# Patient Record
Sex: FEMALE | Race: WHITE | Employment: PART TIME | URBAN - NONMETROPOLITAN AREA
[De-identification: names, ages, dates, MRNs, and addresses within clinical notes are randomized per-mention and may not be internally consistent; named-entity substitution may affect disease eponyms.]

---

## 2017-11-01 LAB
CHOLESTEROL, TOTAL: 169 MG/DL
CHOLESTEROL/HDL RATIO: 4.1
HDLC SERPL-MCNC: 41 MG/DL (ref 35–70)
LDL CHOLESTEROL CALCULATED: 106 MG/DL (ref 0–160)
TRIGL SERPL-MCNC: 123 MG/DL
VLDLC SERPL CALC-MCNC: NORMAL MG/DL

## 2018-02-19 LAB
CREATININE: 0.7 MG/DL
POTASSIUM (K+): 3.7

## 2018-08-31 LAB
CREATININE: 0.8 MG/DL
POTASSIUM (K+): 3.9

## 2019-04-17 VITALS
WEIGHT: 218 LBS | TEMPERATURE: 98.4 F | BODY MASS INDEX: 35.03 KG/M2 | HEIGHT: 66 IN | SYSTOLIC BLOOD PRESSURE: 148 MMHG | DIASTOLIC BLOOD PRESSURE: 96 MMHG | HEART RATE: 80 BPM

## 2019-04-17 RX ORDER — LISINOPRIL AND HYDROCHLOROTHIAZIDE 25; 20 MG/1; MG/1
1 TABLET ORAL DAILY
COMMUNITY
End: 2019-05-13 | Stop reason: SDUPTHER

## 2019-04-17 RX ORDER — CATNIP
POWDER (GRAM) MISCELLANEOUS 2 TIMES DAILY
COMMUNITY
End: 2021-01-08 | Stop reason: ALTCHOICE

## 2019-04-17 RX ORDER — MILK THISTLE 150 MG
150 CAPSULE ORAL DAILY
COMMUNITY
End: 2020-05-22 | Stop reason: ALTCHOICE

## 2019-04-17 RX ORDER — IBUPROFEN 200 MG
200 TABLET ORAL EVERY 6 HOURS PRN
COMMUNITY

## 2019-04-17 RX ORDER — KETOROLAC TROMETHAMINE 5 MG/ML
1 SOLUTION OPHTHALMIC 3 TIMES DAILY
COMMUNITY
End: 2019-05-31 | Stop reason: SDUPTHER

## 2019-05-13 RX ORDER — LISINOPRIL AND HYDROCHLOROTHIAZIDE 25; 20 MG/1; MG/1
1 TABLET ORAL DAILY
Qty: 90 TABLET | Refills: 1 | Status: SHIPPED | OUTPATIENT
Start: 2019-05-13 | End: 2019-10-28 | Stop reason: SDUPTHER

## 2019-05-31 ENCOUNTER — OFFICE VISIT (OUTPATIENT)
Dept: FAMILY MEDICINE CLINIC | Age: 49
End: 2019-05-31
Payer: COMMERCIAL

## 2019-05-31 ENCOUNTER — HOSPITAL ENCOUNTER (OUTPATIENT)
Age: 49
Discharge: HOME | End: 2019-06-02
Payer: COMMERCIAL

## 2019-05-31 VITALS
BODY MASS INDEX: 36.32 KG/M2 | HEIGHT: 66 IN | SYSTOLIC BLOOD PRESSURE: 172 MMHG | TEMPERATURE: 97.5 F | OXYGEN SATURATION: 98 % | HEART RATE: 89 BPM | DIASTOLIC BLOOD PRESSURE: 100 MMHG | WEIGHT: 226 LBS

## 2019-05-31 DIAGNOSIS — R79.89 LIVER FUNCTION TEST ABNORMALITY: ICD-10-CM

## 2019-05-31 DIAGNOSIS — I10 ESSENTIAL HYPERTENSION: Primary | ICD-10-CM

## 2019-05-31 DIAGNOSIS — E66.09 CLASS 2 OBESITY DUE TO EXCESS CALORIES WITHOUT SERIOUS COMORBIDITY WITH BODY MASS INDEX (BMI) OF 37.0 TO 37.9 IN ADULT: ICD-10-CM

## 2019-05-31 DIAGNOSIS — T78.40XD ALLERGIC STATE, SUBSEQUENT ENCOUNTER: ICD-10-CM

## 2019-05-31 DIAGNOSIS — G47.33 OSA (OBSTRUCTIVE SLEEP APNEA): ICD-10-CM

## 2019-05-31 PROBLEM — E66.812 CLASS 2 OBESITY DUE TO EXCESS CALORIES WITHOUT SERIOUS COMORBIDITY WITH BODY MASS INDEX (BMI) OF 37.0 TO 37.9 IN ADULT: Status: ACTIVE | Noted: 2019-05-31

## 2019-05-31 LAB
ALBUMIN SERPL-MCNC: 4.1 G/DL (ref 3.5–5.2)
ALP BLD-CCNC: 54 U/L (ref 35–104)
ALT SERPL-CCNC: 41 U/L (ref 0–32)
ANION GAP SERPL CALCULATED.3IONS-SCNC: 9 MMOL/L (ref 7–16)
AST SERPL-CCNC: 44 U/L (ref 0–31)
BASOPHILS ABSOLUTE: 0.06 E9/L (ref 0–0.2)
BASOPHILS RELATIVE PERCENT: 0.9 % (ref 0–2)
BILIRUB SERPL-MCNC: <0.2 MG/DL (ref 0–1.2)
BUN BLDV-MCNC: 14 MG/DL (ref 6–20)
CALCIUM SERPL-MCNC: 9.2 MG/DL (ref 8.6–10.2)
CHLORIDE BLD-SCNC: 102 MMOL/L (ref 98–107)
CHOLESTEROL, TOTAL: 189 MG/DL (ref 0–199)
CO2: 28 MMOL/L (ref 22–29)
CREAT SERPL-MCNC: 0.8 MG/DL (ref 0.5–1)
EOSINOPHILS ABSOLUTE: 0.31 E9/L (ref 0.05–0.5)
EOSINOPHILS RELATIVE PERCENT: 4.7 % (ref 0–6)
GFR AFRICAN AMERICAN: >60
GFR NON-AFRICAN AMERICAN: >60 ML/MIN/1.73
GLUCOSE BLD-MCNC: 109 MG/DL (ref 74–99)
HCT VFR BLD CALC: 38.3 % (ref 34–48)
HDLC SERPL-MCNC: 42 MG/DL
HEMOGLOBIN: 12.2 G/DL (ref 11.5–15.5)
IMMATURE GRANULOCYTES #: 0.02 E9/L
IMMATURE GRANULOCYTES %: 0.3 % (ref 0–5)
LDL CHOLESTEROL CALCULATED: 118 MG/DL (ref 0–99)
LYMPHOCYTES ABSOLUTE: 1.56 E9/L (ref 1.5–4)
LYMPHOCYTES RELATIVE PERCENT: 23.5 % (ref 20–42)
MCH RBC QN AUTO: 29 PG (ref 26–35)
MCHC RBC AUTO-ENTMCNC: 31.9 % (ref 32–34.5)
MCV RBC AUTO: 91.2 FL (ref 80–99.9)
MONOCYTES ABSOLUTE: 0.5 E9/L (ref 0.1–0.95)
MONOCYTES RELATIVE PERCENT: 7.5 % (ref 2–12)
NEUTROPHILS ABSOLUTE: 4.18 E9/L (ref 1.8–7.3)
NEUTROPHILS RELATIVE PERCENT: 63.1 % (ref 43–80)
PDW BLD-RTO: 13.8 FL (ref 11.5–15)
PLATELET # BLD: 272 E9/L (ref 130–450)
PMV BLD AUTO: 12.2 FL (ref 7–12)
POTASSIUM SERPL-SCNC: 3.9 MMOL/L (ref 3.5–5)
RBC # BLD: 4.2 E12/L (ref 3.5–5.5)
SODIUM BLD-SCNC: 139 MMOL/L (ref 132–146)
TOTAL PROTEIN: 7.7 G/DL (ref 6.4–8.3)
TRIGL SERPL-MCNC: 143 MG/DL (ref 0–149)
VLDLC SERPL CALC-MCNC: 29 MG/DL
WBC # BLD: 6.6 E9/L (ref 4.5–11.5)

## 2019-05-31 PROCEDURE — 80061 LIPID PANEL: CPT

## 2019-05-31 PROCEDURE — 99396 PREV VISIT EST AGE 40-64: CPT | Performed by: INTERNAL MEDICINE

## 2019-05-31 PROCEDURE — 80053 COMPREHEN METABOLIC PANEL: CPT

## 2019-05-31 PROCEDURE — 85025 COMPLETE CBC W/AUTO DIFF WBC: CPT

## 2019-05-31 PROCEDURE — 36415 COLL VENOUS BLD VENIPUNCTURE: CPT

## 2019-05-31 RX ORDER — FEXOFENADINE HCL 180 MG/1
180 TABLET ORAL DAILY
Qty: 90 TABLET | Refills: 2 | Status: SHIPPED | OUTPATIENT
Start: 2019-05-31 | End: 2020-05-22 | Stop reason: SDUPTHER

## 2019-05-31 RX ORDER — KETOROLAC TROMETHAMINE 5 MG/ML
1 SOLUTION OPHTHALMIC 3 TIMES DAILY
Qty: 1 BOTTLE | Refills: 2 | Status: SHIPPED
Start: 2019-05-31 | End: 2020-05-22 | Stop reason: SDUPTHER

## 2019-05-31 RX ORDER — MEDROXYPROGESTERONE ACETATE 10 MG/1
10 TABLET ORAL
Refills: 0 | COMMUNITY
Start: 2019-04-05 | End: 2020-05-22 | Stop reason: ALTCHOICE

## 2019-05-31 RX ORDER — FEXOFENADINE HCL 180 MG/1
180 TABLET ORAL DAILY
Qty: 90 TABLET | Refills: 2 | Status: SHIPPED | OUTPATIENT
Start: 2019-05-31 | End: 2019-05-31 | Stop reason: SDUPTHER

## 2019-05-31 ASSESSMENT — PATIENT HEALTH QUESTIONNAIRE - PHQ9
SUM OF ALL RESPONSES TO PHQ QUESTIONS 1-9: 0
2. FEELING DOWN, DEPRESSED OR HOPELESS: 0
1. LITTLE INTEREST OR PLEASURE IN DOING THINGS: 0
SUM OF ALL RESPONSES TO PHQ QUESTIONS 1-9: 0
SUM OF ALL RESPONSES TO PHQ9 QUESTIONS 1 & 2: 0

## 2019-06-02 ASSESSMENT — ENCOUNTER SYMPTOMS
COUGH: 0
SHORTNESS OF BREATH: 0
ABDOMINAL PAIN: 0
DIARRHEA: 0
WHEEZING: 0
NAUSEA: 0
SINUS PAIN: 0
BLOOD IN STOOL: 0
BACK PAIN: 0
RHINORRHEA: 0
EYES NEGATIVE: 1
CONSTIPATION: 0
VOMITING: 0

## 2019-06-02 NOTE — PROGRESS NOTES
[id number] [facility]     19  TVPage Divers : 1970 Sex: female  Age: 46 y.o. Chief Complaint   Patient presents with    Hypertension   Complete exam    HPI patient presents today for follow-up visit problems below. She has been feeling well. Unfortunately she has gained another 8 pounds. Speech has become more active and has been working on weight. She has had elevated liver enzymes and has seen Dr. Meg Sarabia past. She is now 46years old we also did discuss colonoscopy which she is not ready to take just yet she states but will think about it over the next several months. Dr. Wilhelminia Severance had said that if she achieved a weight of 195 and still had elevated LFTs we will consider liver biopsy. She is aware of this. She brings in blood pressure numbers which looked good. Today's numbers were very high. She has had her machine compared with her work machine which has been very close. She has also been continuing to use her CPAP with success. She is up-to-date with pulmonary and GYN. Review of Systems   Constitutional: Negative for activity change, appetite change, chills, diaphoresis, fatigue, fever and unexpected weight change. Overweight   HENT: Negative for congestion, ear pain, hearing loss, postnasal drip, rhinorrhea and sinus pain. Eyes: Negative. Respiratory: Negative for cough, shortness of breath and wheezing. Cardiovascular: Negative for chest pain, palpitations and leg swelling. Gastrointestinal: Negative for abdominal pain, blood in stool, constipation, diarrhea, nausea and vomiting. Endocrine: Negative. Genitourinary: Negative for difficulty urinating, dysuria, frequency, hematuria and urgency. Musculoskeletal: Negative for arthralgias, back pain, gait problem and myalgias. Skin: Negative. Allergic/Immunologic: Negative for environmental allergies and immunocompromised state.    Neurological: Negative for dizziness, weakness, light-headedness, numbness and headaches. Hematological: Negative. Psychiatric/Behavioral: Negative for sleep disturbance. The patient is not nervous/anxious. REST OF PERTINENT ROS GONE OVER AND WAS NEGATIVE. PMH:  Health Maintenance:  Pelvic/Pap Exam - GYN  Breast Exam - GYN-dr ruiz  Mammogram - GYN  EKG - 1/14, 5/16  Medical Problems:  Hypertension  Fatty Liver - ultrasound 5/14  elevated LFT's - pos hep B atb-has had vaccine  upper lip hemangioma, preeclampsia, Sleep Apnea  FH:  no siblings. Father:  Hypertension, Diverticulosis. Mother:  osteopenia. Maternal Grandmother:  Breast Cancer. SH:  Marital: . Abuse: physical therapist.  Personal Habits: Cigarette Use: Does Not Smoke. Alcohol: Occasionally consumes alcohol    Current Outpatient Medications:     medroxyPROGESTERone (PROVERA) 10 MG tablet, 10 mg Indications: take 20th- 24th every month , Disp: , Rfl: 0    Ibuprofen-diphenhydrAMINE HCl (ADVIL PM) 200-25 MG CAPS, Take by mouth, Disp: , Rfl:     ketorolac (ACULAR) 0.5 % ophthalmic solution, Place 1 drop into both eyes 3 times daily, Disp: 1 Bottle, Rfl: 2    fexofenadine (ALLEGRA) 180 MG tablet, Take 1 tablet by mouth daily, Disp: 90 tablet, Rfl: 2    lisinopril-hydrochlorothiazide (PRINZIDE;ZESTORETIC) 20-25 MG per tablet, Take 1 tablet by mouth daily, Disp: 90 tablet, Rfl: 1    Milk Thistle 150 MG CAPS, Take 150 mg by mouth daily, Disp: , Rfl:     Catnip POWD, by Does not apply route 2 times daily, Disp: , Rfl:     PSYLLIUM PO, Take by mouth, Disp: , Rfl:     ibuprofen (ADVIL;MOTRIN) 200 MG tablet, Take 200 mg by mouth every 6 hours as needed for Pain, Disp: , Rfl:   No Known Allergies    Past Medical History:   Diagnosis Date    Class 2 obesity due to excess calories without serious comorbidity with body mass index (BMI) of 37.0 to 37.9 in adult 5/31/2019    Elevated LFTs     pos.  Hep B atb- has had vaccine    Essential hypertension 5/31/2019    Fatty liver 05/2014    ultrasound    Hemangioma of lip uppper    Hypertension     Liver function test abnormality 5/31/2019    KATARZYNA (obstructive sleep apnea) 5/31/2019    Preeclampsia     Sleep apnea      No past surgical history on file. No family history on file. Social History     Socioeconomic History    Marital status:      Spouse name: Not on file    Number of children: Not on file    Years of education: Not on file    Highest education level: Not on file   Occupational History    Not on file   Social Needs    Financial resource strain: Not on file    Food insecurity:     Worry: Not on file     Inability: Not on file    Transportation needs:     Medical: Not on file     Non-medical: Not on file   Tobacco Use    Smoking status: Never Smoker    Smokeless tobacco: Never Used   Substance and Sexual Activity    Alcohol use: Not on file    Drug use: Not on file    Sexual activity: Not on file   Lifestyle    Physical activity:     Days per week: Not on file     Minutes per session: Not on file    Stress: Not on file   Relationships    Social connections:     Talks on phone: Not on file     Gets together: Not on file     Attends [employer] service: Not on file     Active member of club or organization: Not on file     Attends meetings of clubs or organizations: Not on file     Relationship status: Not on file    Intimate partner violence:     Fear of current or ex partner: Not on file     Emotionally abused: Not on file     Physically abused: Not on file     Forced sexual activity: Not on file   Other Topics Concern    Not on file   Social History Narrative    Not on file       Vitals:    05/31/19 1142   BP: (!) 172/100   Pulse: 89   Temp: 97.5 °F (36.4 °C)   SpO2: 98%   Weight: 226 lb (102.5 kg)   Height: 5' 6\" (1.676 m)       Physical Exam   Constitutional: She is oriented to person, place, and time. She appears well-developed and well-nourished. No distress. HENT:   Head: Normocephalic and atraumatic.    Right Ear: Hearing, tympanic membrane and ear canal normal.   Left Ear: Hearing, tympanic membrane and ear canal normal.   Mouth/Throat: Oropharynx is clear and moist.   Eyes: Pupils are equal, round, and reactive to light. EOM are normal.   Neck: Normal range of motion. Neck supple. No thyromegaly present. Cardiovascular: Normal rate, regular rhythm, normal heart sounds and intact distal pulses. Exam reveals no gallop and no friction rub. No murmur heard. Pulmonary/Chest: Effort normal and breath sounds normal. No respiratory distress. She has no wheezes. She has no rales. Abdominal: Soft. Bowel sounds are normal. She exhibits no distension and no mass. There is no tenderness. Musculoskeletal: Normal range of motion. Lymphadenopathy:     She has no cervical adenopathy. She has no axillary adenopathy. Right: No inguinal adenopathy present. Left: No inguinal adenopathy present. Neurological: She is alert and oriented to person, place, and time. She displays normal reflexes. No sensory deficit. She exhibits normal muscle tone. Coordination normal.   Skin: Skin is warm and dry. No rash noted. No erythema. Psychiatric: She has a normal mood and affect. Her behavior is normal. Judgment and thought content normal.   Nursing note and vitals reviewed. Assessment and Plan:  Dinorah Guallpa was seen today for hypertension. Diagnoses and all orders for this visit:    Essential hypertension  -     CBC Auto Differential; Future  -     Comprehensive Metabolic Panel; Future  -     Lipid Panel; Future    KATARZYNA (obstructive sleep apnea)    Liver function test abnormality    Class 2 obesity due to excess calories without serious comorbidity with body mass index (BMI) of 37.0 to 37.9 in adult    Allergic state, subsequent encounter  -     ketorolac (ACULAR) 0.5 % ophthalmic solution; Place 1 drop into both eyes 3 times daily  -     Discontinue: fexofenadine (ALLEGRA) 180 MG tablet;  Take 1 tablet by mouth daily  -     fexofenadine (ALLEGRA) 180 MG tablet; Take 1 tablet by mouth daily    Plan: Blood work to monitor disease progression and medication use. Continue to monitor blood pressure very closely. Call in numbers in a couple weeks. No adjustment in medication today see above body of report. Prescription management performed. Stressed compliance with follow-up in 6 months. Notify me with problems in the interim. Return in about 6 months (around 11/30/2019). Seen By:  Fabio Wheat MD      *Document was created using voice recognition software. Note was reviewed however may contain grammatical errors.

## 2019-06-03 ENCOUNTER — TELEPHONE (OUTPATIENT)
Dept: FAMILY MEDICINE CLINIC | Age: 49
End: 2019-06-03

## 2019-06-03 NOTE — TELEPHONE ENCOUNTER
----- Message from Alecia Bailey MD sent at 6/2/2019  1:04 PM EDT -----  Liver enzymes again slightly elevated. Blood sugar marginal at 109. LDL cholesterol up slightly further. All of these weight related. As we have discussed she must lose weight.

## 2019-07-19 ENCOUNTER — OFFICE VISIT (OUTPATIENT)
Dept: FAMILY MEDICINE CLINIC | Age: 49
End: 2019-07-19
Payer: COMMERCIAL

## 2019-07-19 ENCOUNTER — HOSPITAL ENCOUNTER (OUTPATIENT)
Age: 49
Discharge: HOME | End: 2019-07-21
Payer: COMMERCIAL

## 2019-07-19 VITALS
DIASTOLIC BLOOD PRESSURE: 88 MMHG | TEMPERATURE: 97.8 F | HEIGHT: 66 IN | WEIGHT: 224.13 LBS | OXYGEN SATURATION: 96 % | HEART RATE: 88 BPM | BODY MASS INDEX: 36.02 KG/M2 | SYSTOLIC BLOOD PRESSURE: 130 MMHG

## 2019-07-19 DIAGNOSIS — R00.2 PALPITATIONS: ICD-10-CM

## 2019-07-19 DIAGNOSIS — R00.2 PALPITATIONS: Primary | ICD-10-CM

## 2019-07-19 LAB
ALBUMIN SERPL-MCNC: 4.6 G/DL (ref 3.5–5.2)
ALP BLD-CCNC: 57 U/L (ref 35–104)
ALT SERPL-CCNC: 63 U/L (ref 0–32)
ANION GAP SERPL CALCULATED.3IONS-SCNC: 11 MMOL/L (ref 7–16)
AST SERPL-CCNC: 63 U/L (ref 0–31)
BILIRUB SERPL-MCNC: 0.2 MG/DL (ref 0–1.2)
BUN BLDV-MCNC: 14 MG/DL (ref 6–20)
CALCIUM SERPL-MCNC: 10.3 MG/DL (ref 8.6–10.2)
CHLORIDE BLD-SCNC: 100 MMOL/L (ref 98–107)
CO2: 29 MMOL/L (ref 22–29)
CREAT SERPL-MCNC: 0.7 MG/DL (ref 0.5–1)
GFR AFRICAN AMERICAN: >60
GFR NON-AFRICAN AMERICAN: >60 ML/MIN/1.73
GLUCOSE BLD-MCNC: 84 MG/DL (ref 74–99)
HCT VFR BLD CALC: 40.2 % (ref 34–48)
HEMOGLOBIN: 13.3 G/DL (ref 11.5–15.5)
MAGNESIUM: 1.8 MG/DL (ref 1.6–2.6)
MCH RBC QN AUTO: 29.1 PG (ref 26–35)
MCHC RBC AUTO-ENTMCNC: 33.1 % (ref 32–34.5)
MCV RBC AUTO: 88 FL (ref 80–99.9)
PDW BLD-RTO: 14.3 FL (ref 11.5–15)
PLATELET # BLD: 294 E9/L (ref 130–450)
PMV BLD AUTO: 12.3 FL (ref 7–12)
POTASSIUM SERPL-SCNC: 4.1 MMOL/L (ref 3.5–5)
RBC # BLD: 4.57 E12/L (ref 3.5–5.5)
SODIUM BLD-SCNC: 140 MMOL/L (ref 132–146)
TOTAL PROTEIN: 8.4 G/DL (ref 6.4–8.3)
TSH SERPL DL<=0.05 MIU/L-ACNC: 3.14 UIU/ML (ref 0.27–4.2)
WBC # BLD: 7.4 E9/L (ref 4.5–11.5)

## 2019-07-19 PROCEDURE — 36415 COLL VENOUS BLD VENIPUNCTURE: CPT

## 2019-07-19 PROCEDURE — 83735 ASSAY OF MAGNESIUM: CPT

## 2019-07-19 PROCEDURE — 84443 ASSAY THYROID STIM HORMONE: CPT

## 2019-07-19 PROCEDURE — 85027 COMPLETE CBC AUTOMATED: CPT

## 2019-07-19 PROCEDURE — 93000 ELECTROCARDIOGRAM COMPLETE: CPT | Performed by: PHYSICIAN ASSISTANT

## 2019-07-19 PROCEDURE — 80053 COMPREHEN METABOLIC PANEL: CPT

## 2019-07-19 PROCEDURE — 99214 OFFICE O/P EST MOD 30 MIN: CPT | Performed by: PHYSICIAN ASSISTANT

## 2019-07-19 SDOH — HEALTH STABILITY: MENTAL HEALTH: HOW OFTEN DO YOU HAVE A DRINK CONTAINING ALCOHOL?: NEVER

## 2019-10-25 ENCOUNTER — TELEPHONE (OUTPATIENT)
Dept: ADMINISTRATIVE | Age: 49
End: 2019-10-25

## 2019-10-25 DIAGNOSIS — I10 ESSENTIAL HYPERTENSION: Primary | ICD-10-CM

## 2019-10-29 RX ORDER — LISINOPRIL AND HYDROCHLOROTHIAZIDE 25; 20 MG/1; MG/1
1 TABLET ORAL DAILY
Qty: 30 TABLET | Refills: 0 | Status: SHIPPED | OUTPATIENT
Start: 2019-10-29 | End: 2019-11-14 | Stop reason: SDUPTHER

## 2019-11-14 DIAGNOSIS — I10 ESSENTIAL HYPERTENSION: ICD-10-CM

## 2019-11-14 RX ORDER — LISINOPRIL AND HYDROCHLOROTHIAZIDE 25; 20 MG/1; MG/1
1 TABLET ORAL DAILY
Qty: 30 TABLET | Refills: 1 | Status: SHIPPED | OUTPATIENT
Start: 2019-11-14 | End: 2019-12-13 | Stop reason: SDUPTHER

## 2019-12-13 ENCOUNTER — TELEPHONE (OUTPATIENT)
Dept: FAMILY MEDICINE CLINIC | Age: 49
End: 2019-12-13

## 2019-12-13 ENCOUNTER — OFFICE VISIT (OUTPATIENT)
Dept: FAMILY MEDICINE CLINIC | Age: 49
End: 2019-12-13
Payer: COMMERCIAL

## 2019-12-13 ENCOUNTER — HOSPITAL ENCOUNTER (OUTPATIENT)
Age: 49
Discharge: HOME | End: 2019-12-15
Payer: COMMERCIAL

## 2019-12-13 VITALS
SYSTOLIC BLOOD PRESSURE: 162 MMHG | OXYGEN SATURATION: 98 % | WEIGHT: 224 LBS | HEART RATE: 80 BPM | DIASTOLIC BLOOD PRESSURE: 94 MMHG | BODY MASS INDEX: 36.15 KG/M2

## 2019-12-13 DIAGNOSIS — G47.33 OSA (OBSTRUCTIVE SLEEP APNEA): ICD-10-CM

## 2019-12-13 DIAGNOSIS — R79.89 LIVER FUNCTION TEST ABNORMALITY: Primary | ICD-10-CM

## 2019-12-13 DIAGNOSIS — I10 ESSENTIAL HYPERTENSION: ICD-10-CM

## 2019-12-13 DIAGNOSIS — E66.09 CLASS 2 OBESITY DUE TO EXCESS CALORIES WITHOUT SERIOUS COMORBIDITY WITH BODY MASS INDEX (BMI) OF 37.0 TO 37.9 IN ADULT: ICD-10-CM

## 2019-12-13 LAB
ALBUMIN SERPL-MCNC: 4.5 G/DL (ref 3.5–5.2)
ALP BLD-CCNC: 55 U/L (ref 35–104)
ALT SERPL-CCNC: 45 U/L (ref 0–32)
ANION GAP SERPL CALCULATED.3IONS-SCNC: 13 MMOL/L (ref 7–16)
AST SERPL-CCNC: 36 U/L (ref 0–31)
BACTERIA: NORMAL /HPF
BASOPHILS ABSOLUTE: 0.08 E9/L (ref 0–0.2)
BASOPHILS RELATIVE PERCENT: 1.1 % (ref 0–2)
BILIRUB SERPL-MCNC: 0.3 MG/DL (ref 0–1.2)
BILIRUBIN URINE: NEGATIVE
BLOOD, URINE: NORMAL
BUN BLDV-MCNC: 13 MG/DL (ref 6–20)
CALCIUM SERPL-MCNC: 9.7 MG/DL (ref 8.6–10.2)
CHLORIDE BLD-SCNC: 101 MMOL/L (ref 98–107)
CHOLESTEROL, TOTAL: 191 MG/DL (ref 0–199)
CLARITY: CLEAR
CO2: 24 MMOL/L (ref 22–29)
COLOR: YELLOW
CREAT SERPL-MCNC: 0.8 MG/DL (ref 0.5–1)
EOSINOPHILS ABSOLUTE: 0.26 E9/L (ref 0.05–0.5)
EOSINOPHILS RELATIVE PERCENT: 3.7 % (ref 0–6)
GFR AFRICAN AMERICAN: >60
GFR NON-AFRICAN AMERICAN: >60 ML/MIN/1.73
GLUCOSE BLD-MCNC: 105 MG/DL (ref 74–99)
GLUCOSE URINE: NEGATIVE MG/DL
HCT VFR BLD CALC: 39.2 % (ref 34–48)
HDLC SERPL-MCNC: 40 MG/DL
HEMOGLOBIN: 12.6 G/DL (ref 11.5–15.5)
IMMATURE GRANULOCYTES #: 0.01 E9/L
IMMATURE GRANULOCYTES %: 0.1 % (ref 0–5)
KETONES, URINE: NEGATIVE MG/DL
LDL CHOLESTEROL CALCULATED: 130 MG/DL (ref 0–99)
LEUKOCYTE ESTERASE, URINE: NEGATIVE
LYMPHOCYTES ABSOLUTE: 1.78 E9/L (ref 1.5–4)
LYMPHOCYTES RELATIVE PERCENT: 25.4 % (ref 20–42)
MCH RBC QN AUTO: 29.1 PG (ref 26–35)
MCHC RBC AUTO-ENTMCNC: 32.1 % (ref 32–34.5)
MCV RBC AUTO: 90.5 FL (ref 80–99.9)
MONOCYTES ABSOLUTE: 0.45 E9/L (ref 0.1–0.95)
MONOCYTES RELATIVE PERCENT: 6.4 % (ref 2–12)
NEUTROPHILS ABSOLUTE: 4.43 E9/L (ref 1.8–7.3)
NEUTROPHILS RELATIVE PERCENT: 63.3 % (ref 43–80)
NITRITE, URINE: NEGATIVE
PDW BLD-RTO: 12.7 FL (ref 11.5–15)
PH UA: 5.5 (ref 5–9)
PLATELET # BLD: 324 E9/L (ref 130–450)
PMV BLD AUTO: 12.1 FL (ref 7–12)
POTASSIUM SERPL-SCNC: 4.1 MMOL/L (ref 3.5–5)
PROTEIN UA: NEGATIVE MG/DL
RBC # BLD: 4.33 E12/L (ref 3.5–5.5)
RBC UA: NORMAL /HPF (ref 0–2)
SODIUM BLD-SCNC: 138 MMOL/L (ref 132–146)
SPECIFIC GRAVITY UA: <=1.005 (ref 1–1.03)
TOTAL PROTEIN: 8 G/DL (ref 6.4–8.3)
TRIGL SERPL-MCNC: 105 MG/DL (ref 0–149)
UROBILINOGEN, URINE: 0.2 E.U./DL
VLDLC SERPL CALC-MCNC: 21 MG/DL
WBC # BLD: 7 E9/L (ref 4.5–11.5)
WBC UA: NORMAL /HPF (ref 0–5)

## 2019-12-13 PROCEDURE — 80061 LIPID PANEL: CPT

## 2019-12-13 PROCEDURE — 81003 URINALYSIS AUTO W/O SCOPE: CPT | Performed by: INTERNAL MEDICINE

## 2019-12-13 PROCEDURE — 36415 COLL VENOUS BLD VENIPUNCTURE: CPT

## 2019-12-13 PROCEDURE — 81001 URINALYSIS AUTO W/SCOPE: CPT

## 2019-12-13 PROCEDURE — 80053 COMPREHEN METABOLIC PANEL: CPT

## 2019-12-13 PROCEDURE — 99214 OFFICE O/P EST MOD 30 MIN: CPT | Performed by: INTERNAL MEDICINE

## 2019-12-13 PROCEDURE — 85025 COMPLETE CBC W/AUTO DIFF WBC: CPT

## 2019-12-13 RX ORDER — LISINOPRIL AND HYDROCHLOROTHIAZIDE 25; 20 MG/1; MG/1
1 TABLET ORAL DAILY
Qty: 90 TABLET | Refills: 1 | Status: SHIPPED
Start: 2019-12-13 | End: 2020-05-22 | Stop reason: SDUPTHER

## 2019-12-13 RX ORDER — AMLODIPINE BESYLATE 5 MG/1
5 TABLET ORAL DAILY
Qty: 30 TABLET | Refills: 5 | Status: SHIPPED
Start: 2019-12-13 | End: 2020-05-22 | Stop reason: SDUPTHER

## 2020-01-24 ENCOUNTER — OFFICE VISIT (OUTPATIENT)
Dept: FAMILY MEDICINE CLINIC | Age: 50
End: 2020-01-24
Payer: COMMERCIAL

## 2020-01-24 VITALS
SYSTOLIC BLOOD PRESSURE: 162 MMHG | OXYGEN SATURATION: 98 % | BODY MASS INDEX: 36.4 KG/M2 | HEIGHT: 66 IN | WEIGHT: 226.5 LBS | HEART RATE: 89 BPM | DIASTOLIC BLOOD PRESSURE: 96 MMHG

## 2020-01-24 PROCEDURE — 99214 OFFICE O/P EST MOD 30 MIN: CPT | Performed by: INTERNAL MEDICINE

## 2020-01-24 RX ORDER — LISINOPRIL 10 MG/1
10 TABLET ORAL DAILY
Qty: 90 TABLET | Refills: 1 | Status: SHIPPED
Start: 2020-01-24 | End: 2020-05-22 | Stop reason: SDUPTHER

## 2020-01-24 ASSESSMENT — PATIENT HEALTH QUESTIONNAIRE - PHQ9
2. FEELING DOWN, DEPRESSED OR HOPELESS: 0
1. LITTLE INTEREST OR PLEASURE IN DOING THINGS: 0
SUM OF ALL RESPONSES TO PHQ9 QUESTIONS 1 & 2: 0
SUM OF ALL RESPONSES TO PHQ QUESTIONS 1-9: 0
SUM OF ALL RESPONSES TO PHQ QUESTIONS 1-9: 0

## 2020-01-24 NOTE — PROGRESS NOTES
Allergic/Immunologic: Negative for environmental allergies and immunocompromised state. Neurological: Negative for dizziness, weakness, light-headedness, numbness and headaches. Hematological: Negative.    Psychiatric/Behavioral: See above  REST OF PERTINENT ROS GONE OVER AND WAS NEGATIVE. PMH:  Health Maintenance:  Pelvic/Pap Exam - GYN  Breast Exam - GYN-dr ruiz  Mammogram - GYN  EKG - 1/14, 5/16  Medical Problems:  Hypertension  Fatty Liver - ultrasound 5/14  elevated LFT's - pos hep B atb-has had vaccine  upper lip hemangioma, preeclampsia, Sleep Apnea currently on CPAP and compliant with this  FH:  no siblings. Father:  Hypertension, Diverticulosis. Mother:  osteopenia. Maternal Grandmother:  Breast Cancer. SH:  Marital: . Abuse: physical therapist.  Personal Habits: Cigarette Use: Does Not Smoke. Alcohol: Occasionally consumes alcohol         Current Outpatient Medications:     diphenhydrAMINE HCl (BENADRYL PO), Take by mouth nightly as needed, Disp: , Rfl:     lisinopril (PRINIVIL;ZESTRIL) 10 MG tablet, Take 1 tablet by mouth daily, Disp: 90 tablet, Rfl: 1    sertraline (ZOLOFT) 50 MG tablet, 1/2 pill po daily x 1 week then 1 pill daily, Disp: 90 tablet, Rfl: 1    lisinopril-hydrochlorothiazide (PRINZIDE;ZESTORETIC) 20-25 MG per tablet, Take 1 tablet by mouth daily, Disp: 90 tablet, Rfl: 1    amLODIPine (NORVASC) 5 MG tablet, Take 1 tablet by mouth daily, Disp: 30 tablet, Rfl: 5    medroxyPROGESTERone (PROVERA) 10 MG tablet, 10 mg Indications: take 20th- 24th every month , Disp: , Rfl: 0    ketorolac (ACULAR) 0.5 % ophthalmic solution, Place 1 drop into both eyes 3 times daily, Disp: 1 Bottle, Rfl: 2    fexofenadine (ALLEGRA) 180 MG tablet, Take 1 tablet by mouth daily, Disp: 90 tablet, Rfl: 2    Milk Thistle 150 MG CAPS, Take 150 mg by mouth daily, Disp: , Rfl:     Catnip POWD, by Does not apply route 2 times daily, Disp: , Rfl:     PSYLLIUM PO, Take by mouth, Disp: , Rfl:    ibuprofen (ADVIL;MOTRIN) 200 MG tablet, Take 200 mg by mouth every 6 hours as needed for Pain, Disp: , Rfl:   No Known Allergies    Past Medical History:   Diagnosis Date    Class 2 obesity due to excess calories without serious comorbidity with body mass index (BMI) of 37.0 to 37.9 in adult 5/31/2019    Elevated LFTs     pos. Hep B atb- has had vaccine    Essential hypertension 5/31/2019    Fatty liver 05/2014    ultrasound    Hemangioma of lip     uppper    Hypertension     Liver function test abnormality 5/31/2019    KATARZYNA (obstructive sleep apnea) 5/31/2019    Preeclampsia     Sleep apnea      No past surgical history on file.   Family History   Problem Relation Age of Onset    Hypertension Mother     Atrial Fibrillation Father     Hypertension Father      Social History     Socioeconomic History    Marital status:      Spouse name: Not on file    Number of children: Not on file    Years of education: Not on file    Highest education level: Not on file   Occupational History    Not on file   Social Needs    Financial resource strain: Not on file    Food insecurity:     Worry: Not on file     Inability: Not on file    Transportation needs:     Medical: Not on file     Non-medical: Not on file   Tobacco Use    Smoking status: Never Smoker    Smokeless tobacco: Never Used   Substance and Sexual Activity    Alcohol use: Never     Frequency: Never    Drug use: Never    Sexual activity: Not on file   Lifestyle    Physical activity:     Days per week: Not on file     Minutes per session: Not on file    Stress: Not on file   Relationships    Social connections:     Talks on phone: Not on file     Gets together: Not on file     Attends Hoahaoism service: Not on file     Active member of club or organization: Not on file     Attends meetings of clubs or organizations: Not on file     Relationship status: Not on file    Intimate partner violence:     Fear of current or ex partner: Not on file     Emotionally abused: Not on file     Physically abused: Not on file     Forced sexual activity: Not on file   Other Topics Concern    Not on file   Social History Narrative    Not on file       Vitals:    01/24/20 0747   BP: (!) 162/96   Pulse: 89   SpO2: 98%   Weight: 226 lb 8 oz (102.7 kg)   Height: 5' 6\" (1.676 m)       Physical Exam    Constitutional: She is oriented to person, place, and time. She appears well-developed and well-nourished. No distress. Neck: Normal range of motion. Neck supple. No thyromegaly present. Cardiovascular: Normal rate, regular rhythm, normal heart sounds and intact distal pulses. Exam reveals no gallop and no friction rub.   No murmur heard. Pulmonary/Chest: Effort normal and breath sounds normal. No respiratory distress. She has no wheezes. She has no rales. Abdominal: Soft. Bowel sounds are normal. She exhibits no distension and no mass. There is no tenderness. Musculoskeletal: Normal range of motion. Neurological: She is alert and oriented to person, place, and time. She displays normal reflexes. No sensory deficit. She exhibits normal muscle tone. Coordination normal.   Skin: Skin is warm and dry. No rash noted. No erythema. Psychiatric: She has a normal mood and affect. Her behavior is normal. Judgment and thought content normal.   Nursing note and vitals reviewed. Assessment and Plan:  Fred Pimentel was seen today for hypertension and other. Diagnoses and all orders for this visit:    Essential hypertension  -     lisinopril (PRINIVIL;ZESTRIL) 10 MG tablet; Take 1 tablet by mouth daily  -     Basic Metabolic Panel;  Future    Anxiety    Class 2 obesity due to excess calories without serious comorbidity with body mass index (BMI) of 37.0 to 37.9 in adult    KATARZYNA (obstructive sleep apnea)    Liver function test abnormality    Other orders  -     sertraline (ZOLOFT) 50 MG tablet; 1/2 pill po daily x 1 week then 1 pill daily    Plan: Restart lisinopril from her current 20/25 and added an additional 10 mg lisinopril. Also started sertraline. Warned of potential side effects of these medications. Continue monitoring blood pressure closely. I will see her back in 1 month and as needed. Blood work in 2 weeks on the new medications. Weight loss attempts. Notify us with problems in the interim. Return in about 1 month (around 2/24/2020). Seen By:  Jero Kaur MD      *Document was created using voice recognition software. Note was reviewed however may contain grammatical errors.

## 2020-02-11 ENCOUNTER — HOSPITAL ENCOUNTER (OUTPATIENT)
Age: 50
Discharge: HOME | End: 2020-02-13
Payer: COMMERCIAL

## 2020-02-11 LAB
ANION GAP SERPL CALCULATED.3IONS-SCNC: 18 MMOL/L (ref 7–16)
BUN BLDV-MCNC: 14 MG/DL (ref 6–20)
CALCIUM SERPL-MCNC: 9.8 MG/DL (ref 8.6–10.2)
CHLORIDE BLD-SCNC: 103 MMOL/L (ref 98–107)
CO2: 19 MMOL/L (ref 22–29)
CREAT SERPL-MCNC: 0.8 MG/DL (ref 0.5–1)
GFR AFRICAN AMERICAN: >60
GFR NON-AFRICAN AMERICAN: >60 ML/MIN/1.73
GLUCOSE BLD-MCNC: 122 MG/DL (ref 74–99)
POTASSIUM SERPL-SCNC: 4.1 MMOL/L (ref 3.5–5)
SODIUM BLD-SCNC: 140 MMOL/L (ref 132–146)

## 2020-02-11 PROCEDURE — 80048 BASIC METABOLIC PNL TOTAL CA: CPT

## 2020-02-11 PROCEDURE — 36415 COLL VENOUS BLD VENIPUNCTURE: CPT

## 2020-02-12 ENCOUNTER — TELEPHONE (OUTPATIENT)
Dept: FAMILY MEDICINE CLINIC | Age: 50
End: 2020-02-12

## 2020-02-26 ENCOUNTER — OFFICE VISIT (OUTPATIENT)
Dept: FAMILY MEDICINE CLINIC | Age: 50
End: 2020-02-26
Payer: COMMERCIAL

## 2020-02-26 VITALS
OXYGEN SATURATION: 96 % | DIASTOLIC BLOOD PRESSURE: 80 MMHG | HEART RATE: 86 BPM | SYSTOLIC BLOOD PRESSURE: 130 MMHG | WEIGHT: 226.4 LBS | HEIGHT: 66 IN | BODY MASS INDEX: 36.38 KG/M2

## 2020-02-26 PROCEDURE — 99213 OFFICE O/P EST LOW 20 MIN: CPT | Performed by: INTERNAL MEDICINE

## 2020-02-26 NOTE — PROGRESS NOTES
[id number] [facility] [address] [facility]     20  Adilson Pryor : 1970 Sex: female  Age: 44 y.o. Chief Complaint   Patient presents with    Hypertension       HPI    Patient presents today for follow-up visit on her medical problems. Last visit we increased her lisinopril by 10 mg/day and added sertraline for her anxiety stress. She is doing some better from the sertraline standpoint although has only been about 3 weeks now. I told her to give it a couple more weeks to really kick in. Blood pressure is still running in the 140 range on numbers that she is getting from home although she is using a smaller cuff. I did ask her to get a larger cuff as this will falsely elevate her numbers. Today here in the office with a larger cuff she was still about 150/90 range. I told her I want her to check over the next month and call in numbers as we may have to increase her amlodipine and/or add another medication. She has been compliant with her CPAP so I do not think this is a real issue with the pressure right now. She has been trying to watch her salt intake as well. Review of Systems   Constitutional: Negative for activity change, appetite change, chills, diaphoresis, fatigue, fever and unexpected weight change.        Overweight   HENT: Negative for congestion, ear pain, hearing loss, postnasal drip, rhinorrhea and sinus pain.    Eyes: Negative.    Respiratory: Negative for cough, shortness of breath and wheezing.    Cardiovascular: Negative for chest pain, palpitations and leg swelling. Gastrointestinal: Negative for abdominal pain, blood in stool, constipation, diarrhea, nausea and vomiting. Endocrine: Negative.    Genitourinary: Negative for difficulty urinating, dysuria, frequency, hematuria and urgency. Musculoskeletal: Negative for arthralgias, back pain, gait problem and myalgias. Skin: Negative.    Allergic/Immunologic: Negative for environmental allergies and immunocompromised state. Neurological: Negative for dizziness, weakness, light-headedness, numbness and headaches. Hematological: Negative.    Psychiatric/Behavioral: See above    REST OF PERTINENT ROS GONE OVER AND WAS NEGATIVE. Health Maintenance:  Pelvic/Pap Exam - GYN  Breast Exam - GYN-dr ruiz  Mammogram - GYN  EKG - 1/14, 5/16  Medical Problems:  Hypertension  Fatty Liver - ultrasound 5/14  elevated LFT's - pos hep B atb-has had vaccine  upper lip hemangioma, preeclampsia, Sleep Apnea currently on CPAP and compliant with this  Anxiety-currently on medication  FH:  no siblings. Father:  Hypertension, Diverticulosis. Mother:  osteopenia. Maternal Grandmother:  Breast Cancer. SH:  Marital: . Abuse: physical therapist.  Personal Habits: Cigarette Use: Does Not Smoke. Alcohol: Occasionally consumes alcohol              Current Outpatient Medications:     diphenhydrAMINE HCl (BENADRYL PO), Take 25 mg by mouth nightly 3 tablets every night, Disp: , Rfl:     lisinopril (PRINIVIL;ZESTRIL) 10 MG tablet, Take 1 tablet by mouth daily, Disp: 90 tablet, Rfl: 1    sertraline (ZOLOFT) 50 MG tablet, 1/2 pill po daily x 1 week then 1 pill daily, Disp: 90 tablet, Rfl: 1    lisinopril-hydrochlorothiazide (PRINZIDE;ZESTORETIC) 20-25 MG per tablet, Take 1 tablet by mouth daily, Disp: 90 tablet, Rfl: 1    amLODIPine (NORVASC) 5 MG tablet, Take 1 tablet by mouth daily, Disp: 30 tablet, Rfl: 5    medroxyPROGESTERone (PROVERA) 10 MG tablet, 10 mg Indications: take 20th- 24th every month , Disp: , Rfl: 0    ketorolac (ACULAR) 0.5 % ophthalmic solution, Place 1 drop into both eyes 3 times daily, Disp: 1 Bottle, Rfl: 2    fexofenadine (ALLEGRA) 180 MG tablet, Take 1 tablet by mouth daily, Disp: 90 tablet, Rfl: 2    Milk Thistle 150 MG CAPS, Take 150 mg by mouth daily, Disp: , Rfl:     Catnip POWD, by Does not apply route 2 times daily, Disp: , Rfl:     PSYLLIUM PO, Take by mouth, Disp: , Rfl:     ibuprofen (ADVIL;MOTRIN) 200 MG

## 2020-05-22 ENCOUNTER — OFFICE VISIT (OUTPATIENT)
Dept: FAMILY MEDICINE CLINIC | Age: 50
End: 2020-05-22
Payer: COMMERCIAL

## 2020-05-22 VITALS
TEMPERATURE: 97.8 F | HEART RATE: 85 BPM | BODY MASS INDEX: 35.74 KG/M2 | DIASTOLIC BLOOD PRESSURE: 78 MMHG | SYSTOLIC BLOOD PRESSURE: 122 MMHG | HEIGHT: 66 IN | WEIGHT: 222.4 LBS | OXYGEN SATURATION: 98 %

## 2020-05-22 PROCEDURE — 99213 OFFICE O/P EST LOW 20 MIN: CPT | Performed by: INTERNAL MEDICINE

## 2020-05-22 RX ORDER — FEXOFENADINE HCL 180 MG/1
180 TABLET ORAL DAILY
Qty: 90 TABLET | Refills: 2 | Status: SHIPPED | OUTPATIENT
Start: 2020-05-22 | End: 2022-10-28

## 2020-05-22 RX ORDER — LISINOPRIL AND HYDROCHLOROTHIAZIDE 25; 20 MG/1; MG/1
1 TABLET ORAL DAILY
Qty: 90 TABLET | Refills: 1 | Status: SHIPPED
Start: 2020-05-22 | End: 2020-10-01 | Stop reason: SDUPTHER

## 2020-05-22 RX ORDER — KETOROLAC TROMETHAMINE 5 MG/ML
1 SOLUTION OPHTHALMIC 3 TIMES DAILY
Qty: 1 BOTTLE | Refills: 3 | Status: SHIPPED
Start: 2020-05-22 | End: 2021-06-07 | Stop reason: SDUPTHER

## 2020-05-22 RX ORDER — LISINOPRIL 10 MG/1
10 TABLET ORAL DAILY
Qty: 90 TABLET | Refills: 1 | Status: SHIPPED
Start: 2020-05-22 | End: 2020-10-01 | Stop reason: SDUPTHER

## 2020-05-22 RX ORDER — AMLODIPINE BESYLATE 5 MG/1
5 TABLET ORAL DAILY
Qty: 30 TABLET | Refills: 5 | Status: SHIPPED
Start: 2020-05-22 | End: 2020-10-01 | Stop reason: SDUPTHER

## 2020-05-22 NOTE — PROGRESS NOTES
on file    Food insecurity     Worry: Not on file     Inability: Not on file    Transportation needs     Medical: Not on file     Non-medical: Not on file   Tobacco Use    Smoking status: Never Smoker    Smokeless tobacco: Never Used   Substance and Sexual Activity    Alcohol use: Never     Frequency: Never    Drug use: Never    Sexual activity: Not on file   Lifestyle    Physical activity     Days per week: Not on file     Minutes per session: Not on file    Stress: Not on file   Relationships    Social connections     Talks on phone: Not on file     Gets together: Not on file     Attends Taoist service: Not on file     Active member of club or organization: Not on file     Attends meetings of clubs or organizations: Not on file     Relationship status: Not on file    Intimate partner violence     Fear of current or ex partner: Not on file     Emotionally abused: Not on file     Physically abused: Not on file     Forced sexual activity: Not on file   Other Topics Concern    Not on file   Social History Narrative    Not on file       Vitals:    05/22/20 0734   BP: 122/78   Pulse: 85   Temp: 97.8 °F (36.6 °C)   TempSrc: Temporal   SpO2: 98%   Weight: 222 lb 6.4 oz (100.9 kg)   Height: 5' 6\" (1.676 m)       Physical Exam  Constitutional: She is oriented to person, place, and time. She appears well-developed and well-nourished. No distress. Neck: Normal range of motion. Neck supple. No thyromegaly present. Cardiovascular: Normal rate, regular rhythm, normal heart sounds and intact distal pulses. Exam reveals no gallop and no friction rub.   No murmur heard. Pulmonary/Chest: Effort normal and breath sounds normal. No respiratory distress. She has no wheezes. She has no rales. Abdominal: Soft. Bowel sounds are normal. She exhibits no distension and no mass. There is no tenderness. Musculoskeletal: Normal range of motion. Neurological: She is alert and oriented to person, place, and time.  She displays normal reflexes. No sensory deficit. She exhibits normal muscle tone. Coordination normal.   Skin: Skin is warm and dry. No rash noted. No erythema. Psychiatric: She has a normal mood and affect. Her behavior is normal. Judgment and thought content normal.   Nursing note and vitals reviewed        Assessment and Plan:  Susannah Teixeira was seen today for hypertension. Diagnoses and all orders for this visit:    Fatty liver    Essential hypertension  -     amLODIPine (NORVASC) 5 MG tablet; Take 1 tablet by mouth daily  -     lisinopril (PRINIVIL;ZESTRIL) 10 MG tablet; Take 1 tablet by mouth daily  -     lisinopril-hydroCHLOROthiazide (PRINZIDE;ZESTORETIC) 20-25 MG per tablet; Take 1 tablet by mouth daily  -     CBC Auto Differential; Future  -     Comprehensive Metabolic Panel; Future  -     Lipid Panel; Future    Allergic state, subsequent encounter  -     fexofenadine (ALLEGRA) 180 MG tablet; Take 1 tablet by mouth daily  -     ketorolac (ACULAR) 0.5 % ophthalmic solution; Place 1 drop into both eyes 3 times daily    KATARZYNA (obstructive sleep apnea)    Other orders  -     sertraline (ZOLOFT) 50 MG tablet; 1/2 pill po daily x 1 week then 1 pill daily      Plan: I will see her back in 3 months and as needed. If all is well at that point may extend her visits out. Prescription management performed. She will get blood work done in the next 2 weeks fasting to monitor disease progression and medication use. Continue weight loss attempts. Notify us of problems in the interim. Return in about 3 months (around 8/22/2020). Seen By:  Gay Barr MD      *Document was created using voice recognition software. Note was reviewed however may contain grammatical errors.

## 2020-05-27 ENCOUNTER — TELEPHONE (OUTPATIENT)
Dept: FAMILY MEDICINE CLINIC | Age: 50
End: 2020-05-27

## 2020-05-27 ENCOUNTER — HOSPITAL ENCOUNTER (OUTPATIENT)
Age: 50
Discharge: HOME | End: 2020-05-29
Payer: COMMERCIAL

## 2020-05-27 LAB
ALBUMIN SERPL-MCNC: 4.2 G/DL (ref 3.5–5.2)
ALP BLD-CCNC: 49 U/L (ref 35–104)
ALT SERPL-CCNC: 34 U/L (ref 0–32)
ANION GAP SERPL CALCULATED.3IONS-SCNC: 17 MMOL/L (ref 7–16)
AST SERPL-CCNC: 35 U/L (ref 0–31)
BASOPHILS ABSOLUTE: 0.07 E9/L (ref 0–0.2)
BASOPHILS RELATIVE PERCENT: 1.2 % (ref 0–2)
BILIRUB SERPL-MCNC: 0.2 MG/DL (ref 0–1.2)
BUN BLDV-MCNC: 14 MG/DL (ref 6–20)
CALCIUM SERPL-MCNC: 9.2 MG/DL (ref 8.6–10.2)
CHLORIDE BLD-SCNC: 103 MMOL/L (ref 98–107)
CHOLESTEROL, TOTAL: 180 MG/DL (ref 0–199)
CO2: 22 MMOL/L (ref 22–29)
CREAT SERPL-MCNC: 0.8 MG/DL (ref 0.5–1)
EOSINOPHILS ABSOLUTE: 0.22 E9/L (ref 0.05–0.5)
EOSINOPHILS RELATIVE PERCENT: 3.6 % (ref 0–6)
GFR AFRICAN AMERICAN: >60
GFR NON-AFRICAN AMERICAN: >60 ML/MIN/1.73
GLUCOSE BLD-MCNC: 118 MG/DL (ref 74–99)
HCT VFR BLD CALC: 37.3 % (ref 34–48)
HDLC SERPL-MCNC: 39 MG/DL
HEMOGLOBIN: 11.8 G/DL (ref 11.5–15.5)
IMMATURE GRANULOCYTES #: 0.01 E9/L
IMMATURE GRANULOCYTES %: 0.2 % (ref 0–5)
LDL CHOLESTEROL CALCULATED: 122 MG/DL (ref 0–99)
LYMPHOCYTES ABSOLUTE: 1.8 E9/L (ref 1.5–4)
LYMPHOCYTES RELATIVE PERCENT: 29.7 % (ref 20–42)
MCH RBC QN AUTO: 28.6 PG (ref 26–35)
MCHC RBC AUTO-ENTMCNC: 31.6 % (ref 32–34.5)
MCV RBC AUTO: 90.3 FL (ref 80–99.9)
MONOCYTES ABSOLUTE: 0.51 E9/L (ref 0.1–0.95)
MONOCYTES RELATIVE PERCENT: 8.4 % (ref 2–12)
NEUTROPHILS ABSOLUTE: 3.46 E9/L (ref 1.8–7.3)
NEUTROPHILS RELATIVE PERCENT: 56.9 % (ref 43–80)
PDW BLD-RTO: 13.5 FL (ref 11.5–15)
PLATELET # BLD: 306 E9/L (ref 130–450)
PMV BLD AUTO: 12.1 FL (ref 7–12)
POTASSIUM SERPL-SCNC: 4.2 MMOL/L (ref 3.5–5)
RBC # BLD: 4.13 E12/L (ref 3.5–5.5)
SODIUM BLD-SCNC: 142 MMOL/L (ref 132–146)
TOTAL PROTEIN: 7.5 G/DL (ref 6.4–8.3)
TRIGL SERPL-MCNC: 94 MG/DL (ref 0–149)
VLDLC SERPL CALC-MCNC: 19 MG/DL
WBC # BLD: 6.1 E9/L (ref 4.5–11.5)

## 2020-05-27 PROCEDURE — 80053 COMPREHEN METABOLIC PANEL: CPT

## 2020-05-27 PROCEDURE — 80061 LIPID PANEL: CPT

## 2020-05-27 PROCEDURE — 85025 COMPLETE CBC W/AUTO DIFF WBC: CPT

## 2020-05-27 PROCEDURE — 36415 COLL VENOUS BLD VENIPUNCTURE: CPT

## 2020-08-21 ENCOUNTER — OFFICE VISIT (OUTPATIENT)
Dept: FAMILY MEDICINE CLINIC | Age: 50
End: 2020-08-21
Payer: COMMERCIAL

## 2020-08-21 VITALS
WEIGHT: 232.2 LBS | OXYGEN SATURATION: 97 % | DIASTOLIC BLOOD PRESSURE: 86 MMHG | HEIGHT: 66 IN | TEMPERATURE: 97.7 F | BODY MASS INDEX: 37.32 KG/M2 | SYSTOLIC BLOOD PRESSURE: 146 MMHG | HEART RATE: 81 BPM

## 2020-08-21 PROCEDURE — 99213 OFFICE O/P EST LOW 20 MIN: CPT | Performed by: INTERNAL MEDICINE

## 2020-08-21 ASSESSMENT — PATIENT HEALTH QUESTIONNAIRE - PHQ9
SUM OF ALL RESPONSES TO PHQ QUESTIONS 1-9: 0
1. LITTLE INTEREST OR PLEASURE IN DOING THINGS: 0
SUM OF ALL RESPONSES TO PHQ9 QUESTIONS 1 & 2: 0
SUM OF ALL RESPONSES TO PHQ QUESTIONS 1-9: 0
2. FEELING DOWN, DEPRESSED OR HOPELESS: 0

## 2020-08-23 NOTE — PROGRESS NOTES
[id number] [facility]     20  Breezy Padron : 1970 Sex: female  Age: 44 y.o. Chief Complaint   Patient presents with    Hypertension       HPI  Patient presents today for 3-month follow-up visit on her medical problems. Weight unfortunately is up another 10 pounds. She is having great difficulty losing weight. Blood pressure has been up on her machine. States when she has checked it at work on a few occasions it had been okay. She states she is going to get another machine with large cuff which is what she needs for more accurate readings. She has not been very active recently either. She does describe some hand numbness bilaterally mostly at nighttime denies any neck pain states she sleeps on her sides. Does not appear to be carpal tunnel type symptomatology by the distribution. I told her we could do EMG and nerve conduction testing to differentiate where this is coming from but she has decided to wait on this for now. States she is been compliant with her blood pressure medication. She is currently on amlodipine and lisinopril and tolerating okay. She is up-to-date with GYN. Review of Systems     Constitutional: Negative for activity change, appetite change, chills, diaphoresis, fatigue, fever and unexpected weight change.        Overweight   HENT: Negative for congestion, ear pain, hearing loss, postnasal drip, rhinorrhea and sinus pain.    Eyes: Negative.    Respiratory: Negative for cough, shortness of breath and wheezing.    Cardiovascular: Negative for chest pain, palpitations and leg swelling. Gastrointestinal: Negative for abdominal pain, blood in stool, constipation, diarrhea, nausea and vomiting. Endocrine: Negative.    Genitourinary: Negative for difficulty urinating, dysuria, frequency, hematuria and urgency. Musculoskeletal: Negative for arthralgias, back pain, gait problem and myalgias.    Skin: Negative.    Allergic/Immunologic: Negative for environmental allergies and immunocompromised state. Neurological: Negative for dizziness, weakness, light-headedness, and headaches. She has positive for hand paresthesia as noted above  Hematological: Negative.    Psychiatric/Behavioral: Doing well on the sertraline for her depression         REST OF PERTINENT ROS GONE OVER AND WAS NEGATIVE. Current Outpatient Medications:     amLODIPine (NORVASC) 5 MG tablet, Take 1 tablet by mouth daily, Disp: 30 tablet, Rfl: 5    fexofenadine (ALLEGRA) 180 MG tablet, Take 1 tablet by mouth daily, Disp: 90 tablet, Rfl: 2    ketorolac (ACULAR) 0.5 % ophthalmic solution, Place 1 drop into both eyes 3 times daily, Disp: 1 Bottle, Rfl: 3    lisinopril (PRINIVIL;ZESTRIL) 10 MG tablet, Take 1 tablet by mouth daily, Disp: 90 tablet, Rfl: 1    lisinopril-hydroCHLOROthiazide (PRINZIDE;ZESTORETIC) 20-25 MG per tablet, Take 1 tablet by mouth daily, Disp: 90 tablet, Rfl: 1    sertraline (ZOLOFT) 50 MG tablet, 1/2 pill po daily x 1 week then 1 pill daily, Disp: 90 tablet, Rfl: 1    diphenhydrAMINE HCl (BENADRYL PO), Take 25 mg by mouth nightly 3 tablets every night, Disp: , Rfl:     Catnip POWD, by Does not apply route 2 times daily, Disp: , Rfl:     PSYLLIUM PO, Take by mouth, Disp: , Rfl:     ibuprofen (ADVIL;MOTRIN) 200 MG tablet, Take 200 mg by mouth every 6 hours as needed for Pain, Disp: , Rfl:   No Known Allergies    Past Medical History:   Diagnosis Date    Class 2 obesity due to excess calories without serious comorbidity with body mass index (BMI) of 37.0 to 37.9 in adult 5/31/2019    Elevated LFTs     pos. Hep B atb- has had vaccine    Essential hypertension 5/31/2019    Fatty liver 05/2014    ultrasound    Hemangioma of lip     uppper    Hypertension     Liver function test abnormality 5/31/2019    KATARZYNA (obstructive sleep apnea) 5/31/2019    Preeclampsia     Sleep apnea      No past surgical history on file.   Family History   Problem Relation Age of Onset  Hypertension Mother     Atrial Fibrillation Father     Hypertension Father      Social History     Socioeconomic History    Marital status:      Spouse name: Not on file    Number of children: Not on file    Years of education: Not on file    Highest education level: Not on file   Occupational History    Not on file   Social Needs    Financial resource strain: Not on file    Food insecurity     Worry: Not on file     Inability: Not on file    Transportation needs     Medical: Not on file     Non-medical: Not on file   Tobacco Use    Smoking status: Never Smoker    Smokeless tobacco: Never Used   Substance and Sexual Activity    Alcohol use: Never     Frequency: Never    Drug use: Never    Sexual activity: Not on file   Lifestyle    Physical activity     Days per week: Not on file     Minutes per session: Not on file    Stress: Not on file   Relationships    Social connections     Talks on phone: Not on file     Gets together: Not on file     Attends Alevism service: Not on file     Active member of club or organization: Not on file     Attends meetings of clubs or organizations: Not on file     Relationship status: Not on file    Intimate partner violence     Fear of current or ex partner: Not on file     Emotionally abused: Not on file     Physically abused: Not on file     Forced sexual activity: Not on file   Other Topics Concern    Not on file   Social History Narrative    Not on file       Vitals:    08/21/20 0811   BP: (!) 146/86   Pulse: 81   Temp: 97.7 °F (36.5 °C)   TempSrc: Temporal   SpO2: 97%   Weight: 232 lb 3.2 oz (105.3 kg)   Height: 5' 6\" (1.676 m)       Physical Exam    Constitutional: She is oriented to person, place, and time. She appears well-developed and well-nourished. No distress. Neck: Normal range of motion. Neck supple. No thyromegaly present. Cardiovascular: Normal rate, regular rhythm, normal heart sounds and intact distal pulses.  Exam reveals no gallop and no friction rub.   No murmur heard. Pulmonary/Chest: Effort normal and breath sounds normal. No respiratory distress. She has no wheezes. She has no rales. Abdominal: Soft. Bowel sounds are normal. She exhibits no distension and no mass. There is no tenderness. Musculoskeletal: Normal range of motion. Neurological: She is alert and oriented to person, place, and time. She displays normal reflexes. No sensory deficit. She exhibits normal muscle tone. Coordination normal.   Skin: Skin is warm and dry. No rash noted. No erythema. Psychiatric: She has a normal mood and affect. Her behavior is normal. Judgment and thought content normal.   Nursing note and vitals reviewed        Assessment and Plan:  Eva Patterson was seen today for hypertension. Diagnoses and all orders for this visit:    Essential hypertension    KATARZYNA (obstructive sleep apnea)    Fatigue, unspecified type    Paresthesia      Plan: See above body report. I am not changing medication currently until she gets her new cuff and start monitoring more closely. I will see her back in 6 weeks and will get fasting blood work at that point notify me if she changes her mind about her paresthesia work-up. She must lose weight. Notify us of problems in the interim. Return in about 6 weeks (around 10/2/2020). Seen By:  Hayden Esaton MD      *Document was created using voice recognition software. Note was reviewed however may contain grammatical errors.

## 2020-10-01 ENCOUNTER — HOSPITAL ENCOUNTER (OUTPATIENT)
Age: 50
Discharge: HOME | End: 2020-10-03
Payer: COMMERCIAL

## 2020-10-01 ENCOUNTER — TELEPHONE (OUTPATIENT)
Dept: FAMILY MEDICINE CLINIC | Age: 50
End: 2020-10-01

## 2020-10-01 ENCOUNTER — OFFICE VISIT (OUTPATIENT)
Dept: FAMILY MEDICINE CLINIC | Age: 50
End: 2020-10-01
Payer: COMMERCIAL

## 2020-10-01 VITALS
SYSTOLIC BLOOD PRESSURE: 132 MMHG | DIASTOLIC BLOOD PRESSURE: 82 MMHG | WEIGHT: 232.4 LBS | OXYGEN SATURATION: 98 % | TEMPERATURE: 97.9 F | HEIGHT: 66 IN | BODY MASS INDEX: 37.35 KG/M2 | HEART RATE: 84 BPM

## 2020-10-01 LAB
ALBUMIN SERPL-MCNC: 4.5 G/DL (ref 3.5–5.2)
ALP BLD-CCNC: 54 U/L (ref 35–104)
ALT SERPL-CCNC: 81 U/L (ref 0–32)
ANION GAP SERPL CALCULATED.3IONS-SCNC: 16 MMOL/L (ref 7–16)
AST SERPL-CCNC: 98 U/L (ref 0–31)
BASOPHILS ABSOLUTE: 0.08 E9/L (ref 0–0.2)
BASOPHILS RELATIVE PERCENT: 1 % (ref 0–2)
BILIRUB SERPL-MCNC: 0.3 MG/DL (ref 0–1.2)
BUN BLDV-MCNC: 14 MG/DL (ref 6–20)
CALCIUM SERPL-MCNC: 9.5 MG/DL (ref 8.6–10.2)
CHLORIDE BLD-SCNC: 100 MMOL/L (ref 98–107)
CHOLESTEROL, TOTAL: 205 MG/DL (ref 0–199)
CO2: 22 MMOL/L (ref 22–29)
CREAT SERPL-MCNC: 0.7 MG/DL (ref 0.5–1)
EOSINOPHILS ABSOLUTE: 0.2 E9/L (ref 0.05–0.5)
EOSINOPHILS RELATIVE PERCENT: 2.5 % (ref 0–6)
GFR AFRICAN AMERICAN: >60
GFR NON-AFRICAN AMERICAN: >60 ML/MIN/1.73
GLUCOSE BLD-MCNC: 118 MG/DL (ref 74–99)
HCT VFR BLD CALC: 35.5 % (ref 34–48)
HDLC SERPL-MCNC: 44 MG/DL
HEMOGLOBIN: 11.3 G/DL (ref 11.5–15.5)
IMMATURE GRANULOCYTES #: 0.03 E9/L
IMMATURE GRANULOCYTES %: 0.4 % (ref 0–5)
LDL CHOLESTEROL CALCULATED: 137 MG/DL (ref 0–99)
LYMPHOCYTES ABSOLUTE: 2.41 E9/L (ref 1.5–4)
LYMPHOCYTES RELATIVE PERCENT: 30.6 % (ref 20–42)
MCH RBC QN AUTO: 29 PG (ref 26–35)
MCHC RBC AUTO-ENTMCNC: 31.8 % (ref 32–34.5)
MCV RBC AUTO: 91 FL (ref 80–99.9)
MONOCYTES ABSOLUTE: 0.52 E9/L (ref 0.1–0.95)
MONOCYTES RELATIVE PERCENT: 6.6 % (ref 2–12)
NEUTROPHILS ABSOLUTE: 4.63 E9/L (ref 1.8–7.3)
NEUTROPHILS RELATIVE PERCENT: 58.9 % (ref 43–80)
PDW BLD-RTO: 14.2 FL (ref 11.5–15)
PLATELET # BLD: 335 E9/L (ref 130–450)
PMV BLD AUTO: 11.7 FL (ref 7–12)
POTASSIUM SERPL-SCNC: 3.9 MMOL/L (ref 3.5–5)
RBC # BLD: 3.9 E12/L (ref 3.5–5.5)
SODIUM BLD-SCNC: 138 MMOL/L (ref 132–146)
TOTAL PROTEIN: 8.1 G/DL (ref 6.4–8.3)
TRIGL SERPL-MCNC: 119 MG/DL (ref 0–149)
VLDLC SERPL CALC-MCNC: 24 MG/DL
WBC # BLD: 7.9 E9/L (ref 4.5–11.5)

## 2020-10-01 PROCEDURE — 80053 COMPREHEN METABOLIC PANEL: CPT

## 2020-10-01 PROCEDURE — 80061 LIPID PANEL: CPT

## 2020-10-01 PROCEDURE — 99213 OFFICE O/P EST LOW 20 MIN: CPT | Performed by: INTERNAL MEDICINE

## 2020-10-01 PROCEDURE — 85025 COMPLETE CBC W/AUTO DIFF WBC: CPT

## 2020-10-01 PROCEDURE — 36415 COLL VENOUS BLD VENIPUNCTURE: CPT

## 2020-10-01 RX ORDER — LISINOPRIL AND HYDROCHLOROTHIAZIDE 25; 20 MG/1; MG/1
1 TABLET ORAL DAILY
Qty: 90 TABLET | Refills: 1 | Status: SHIPPED
Start: 2020-10-01 | End: 2021-01-08 | Stop reason: SDUPTHER

## 2020-10-01 RX ORDER — LISINOPRIL 10 MG/1
10 TABLET ORAL DAILY
Qty: 90 TABLET | Refills: 1 | Status: SHIPPED
Start: 2020-10-01 | End: 2021-01-08 | Stop reason: SDUPTHER

## 2020-10-01 RX ORDER — AMLODIPINE BESYLATE 5 MG/1
5 TABLET ORAL DAILY
Qty: 30 TABLET | Refills: 5 | Status: SHIPPED
Start: 2020-10-01 | End: 2021-01-08 | Stop reason: SDUPTHER

## 2020-10-01 RX ORDER — MEDROXYPROGESTERONE ACETATE 10 MG/1
10 TABLET ORAL DAILY
COMMUNITY
End: 2021-01-08 | Stop reason: ALTCHOICE

## 2020-10-01 NOTE — PROGRESS NOTES
[id number] [facility] [address] [facility]     10/1/20  Abigail Chávez : 1970 Sex: female  Age: 44 y.o. Chief Complaint   Patient presents with    Hypertension       HPI  Patient presents today for follow-up visit on her medical problems. She did get a new blood pressure cuff but it is too small. I told her she is going to get a bigger 1 to be more accurate. Number she is getting on this machine however still too high. What we are getting here in the office is better. Weight is about the same. She is watching salt intake. She is doing well with her CPAP for obstructive sleep apnea. Review of Systems     Constitutional: Negative for activity change, appetite change, chills, diaphoresis, fatigue, fever and unexpected weight change.        Overweight   HENT: Negative for congestion, ear pain, hearing loss, postnasal drip, rhinorrhea and sinus pain.    Eyes: Negative.    Respiratory: Negative for cough, shortness of breath and wheezing.    Cardiovascular: Negative for chest pain, palpitations and leg swelling. Gastrointestinal: Negative for abdominal pain, blood in stool, constipation, diarrhea, nausea and vomiting. Endocrine: Negative.    Genitourinary: Negative for difficulty urinating, dysuria, frequency, hematuria and urgency. Musculoskeletal: Negative for arthralgias, back pain, gait problem and myalgias. Skin: Negative.    Allergic/Immunologic: Negative for environmental allergies and immunocompromised state. Neurological: Negative for dizziness, weakness, light-headedness, and headaches. She has positive for hand paresthesia as noted above  Hematological: Negative.    Psychiatric/Behavioral: Doing well on the sertraline for her depression         REST OF PERTINENT ROS GONE OVER AND WAS NEGATIVE.                Current Outpatient Medications:     medroxyPROGESTERone (PROVERA) 10 MG tablet, Take 10 mg by mouth daily For the next 6 days, Disp: , Rfl:     amLODIPine (NORVASC) 5 MG tablet, Take 1 tablet by mouth daily, Disp: 30 tablet, Rfl: 5    lisinopril (PRINIVIL;ZESTRIL) 10 MG tablet, Take 1 tablet by mouth daily, Disp: 90 tablet, Rfl: 1    lisinopril-hydroCHLOROthiazide (PRINZIDE;ZESTORETIC) 20-25 MG per tablet, Take 1 tablet by mouth daily, Disp: 90 tablet, Rfl: 1    sertraline (ZOLOFT) 50 MG tablet, 1/2 pill po daily x 1 week then 1 pill daily, Disp: 90 tablet, Rfl: 1    fexofenadine (ALLEGRA) 180 MG tablet, Take 1 tablet by mouth daily, Disp: 90 tablet, Rfl: 2    ketorolac (ACULAR) 0.5 % ophthalmic solution, Place 1 drop into both eyes 3 times daily, Disp: 1 Bottle, Rfl: 3    diphenhydrAMINE HCl (BENADRYL PO), Take 25 mg by mouth nightly 3 tablets every night, Disp: , Rfl:     Catnip POWD, by Does not apply route 2 times daily, Disp: , Rfl:     PSYLLIUM PO, Take by mouth, Disp: , Rfl:     ibuprofen (ADVIL;MOTRIN) 200 MG tablet, Take 200 mg by mouth every 6 hours as needed for Pain, Disp: , Rfl:   No Known Allergies    Past Medical History:   Diagnosis Date    Class 2 obesity due to excess calories without serious comorbidity with body mass index (BMI) of 37.0 to 37.9 in adult 5/31/2019    Elevated LFTs     pos. Hep B atb- has had vaccine    Essential hypertension 5/31/2019    Fatty liver 05/2014    ultrasound    Hemangioma of lip     uppper    Hypertension     Liver function test abnormality 5/31/2019    KATARZYNA (obstructive sleep apnea) 5/31/2019    Preeclampsia     Sleep apnea      No past surgical history on file.   Family History   Problem Relation Age of Onset    Hypertension Mother     Atrial Fibrillation Father     Hypertension Father      Social History     Socioeconomic History    Marital status:      Spouse name: Not on file    Number of children: Not on file    Years of education: Not on file    Highest education level: Not on file   Occupational History    Not on file   Social Needs    Financial resource strain: Not on file    Food insecurity     Worry: Not on file     Inability: Not on file    Transportation needs     Medical: Not on file     Non-medical: Not on file   Tobacco Use    Smoking status: Never Smoker    Smokeless tobacco: Never Used   Substance and Sexual Activity    Alcohol use: Never     Frequency: Never    Drug use: Never    Sexual activity: Not on file   Lifestyle    Physical activity     Days per week: Not on file     Minutes per session: Not on file    Stress: Not on file   Relationships    Social connections     Talks on phone: Not on file     Gets together: Not on file     Attends Buddhism service: Not on file     Active member of club or organization: Not on file     Attends meetings of clubs or organizations: Not on file     Relationship status: Not on file    Intimate partner violence     Fear of current or ex partner: Not on file     Emotionally abused: Not on file     Physically abused: Not on file     Forced sexual activity: Not on file   Other Topics Concern    Not on file   Social History Narrative    Not on file       Vitals:    10/01/20 0800   BP: 132/82   Pulse: 84   Temp: 97.9 °F (36.6 °C)   TempSrc: Temporal   SpO2: 98%   Weight: 232 lb 6.4 oz (105.4 kg)   Height: 5' 6\" (1.676 m)       Physical Exam  Constitutional: She is oriented to person, place, and time. She appears well-developed and well-nourished. No distress. Neck: Normal range of motion. Neck supple. No thyromegaly present. Cardiovascular: Normal rate, regular rhythm, normal heart sounds and intact distal pulses. Exam reveals no gallop and no friction rub.   No murmur heard. Pulmonary/Chest: Effort normal and breath sounds normal. No respiratory distress. She has no wheezes. She has no rales. Abdominal: Soft. Bowel sounds are normal. She exhibits no distension and no mass. There is no tenderness. Musculoskeletal: Normal range of motion. Neurological: She is alert and oriented to person, place, and time. She displays normal reflexes. No sensory deficit. She exhibits normal muscle tone. Coordination normal.   Skin: Skin is warm and dry. No rash noted. No erythema. Psychiatric: She has a normal mood and affect. Her behavior is normal. Judgment and thought content normal.   Nursing note and vitals reviewed             Assessment and Plan:  Deborah Sarmiento was seen today for hypertension. Diagnoses and all orders for this visit:    KATARZYNA (obstructive sleep apnea)    Essential hypertension  -     amLODIPine (NORVASC) 5 MG tablet; Take 1 tablet by mouth daily  -     lisinopril (PRINIVIL;ZESTRIL) 10 MG tablet; Take 1 tablet by mouth daily  -     lisinopril-hydroCHLOROthiazide (PRINZIDE;ZESTORETIC) 20-25 MG per tablet; Take 1 tablet by mouth daily  -     CBC Auto Differential; Future  -     Comprehensive Metabolic Panel; Future  -     Lipid Panel; Future    Other orders  -     sertraline (ZOLOFT) 50 MG tablet; 1/2 pill po daily x 1 week then 1 pill daily    Plan: See above body report. Call in about a month with update on blood pressures. We will need to get a larger cuff. Weight loss attempts. Notify us of problems in the interim. We will get blood work today to monitor disease progression medication use. Prescription management performed. Return in about 3 months (around 1/1/2021). Seen By:  Lyric Marsh MD      *Document was created using voice recognition software. Note was reviewed however may contain grammatical errors.

## 2020-10-01 NOTE — TELEPHONE ENCOUNTER
Blood work showing prediabetic glucose numbers. Lipid LDL slightly worse. Liver enzymes have gone up further consistent with her fatty liver disease. .  Most of this related to weight. She must lose some weight or further medication may need to be considered.

## 2021-01-08 ENCOUNTER — TELEPHONE (OUTPATIENT)
Dept: FAMILY MEDICINE CLINIC | Age: 51
End: 2021-01-08

## 2021-01-08 ENCOUNTER — OFFICE VISIT (OUTPATIENT)
Dept: FAMILY MEDICINE CLINIC | Age: 51
End: 2021-01-08
Payer: COMMERCIAL

## 2021-01-08 VITALS
OXYGEN SATURATION: 99 % | HEART RATE: 85 BPM | DIASTOLIC BLOOD PRESSURE: 78 MMHG | HEIGHT: 66 IN | WEIGHT: 231.8 LBS | BODY MASS INDEX: 37.25 KG/M2 | TEMPERATURE: 98.1 F | SYSTOLIC BLOOD PRESSURE: 122 MMHG

## 2021-01-08 DIAGNOSIS — G47.33 OSA (OBSTRUCTIVE SLEEP APNEA): ICD-10-CM

## 2021-01-08 DIAGNOSIS — I10 ESSENTIAL HYPERTENSION: ICD-10-CM

## 2021-01-08 DIAGNOSIS — K76.0 FATTY LIVER: ICD-10-CM

## 2021-01-08 DIAGNOSIS — F41.9 ANXIETY: ICD-10-CM

## 2021-01-08 LAB
ALBUMIN SERPL-MCNC: 4.4 G/DL (ref 3.5–5.2)
ALP BLD-CCNC: 48 U/L (ref 35–104)
ALT SERPL-CCNC: 62 U/L (ref 0–32)
ANION GAP SERPL CALCULATED.3IONS-SCNC: 10 MMOL/L (ref 7–16)
AST SERPL-CCNC: 54 U/L (ref 0–31)
BASOPHILS ABSOLUTE: 0.08 E9/L (ref 0–0.2)
BASOPHILS RELATIVE PERCENT: 1.1 % (ref 0–2)
BILIRUB SERPL-MCNC: 0.3 MG/DL (ref 0–1.2)
BUN BLDV-MCNC: 16 MG/DL (ref 6–20)
CALCIUM SERPL-MCNC: 9.8 MG/DL (ref 8.6–10.2)
CHLORIDE BLD-SCNC: 98 MMOL/L (ref 98–107)
CO2: 24 MMOL/L (ref 22–29)
CREAT SERPL-MCNC: 0.8 MG/DL (ref 0.5–1)
EOSINOPHILS ABSOLUTE: 0.15 E9/L (ref 0.05–0.5)
EOSINOPHILS RELATIVE PERCENT: 2.1 % (ref 0–6)
GFR AFRICAN AMERICAN: >60
GFR NON-AFRICAN AMERICAN: >60 ML/MIN/1.73
GLUCOSE BLD-MCNC: 119 MG/DL (ref 74–99)
HCT VFR BLD CALC: 38.5 % (ref 34–48)
HEMOGLOBIN: 12.2 G/DL (ref 11.5–15.5)
IMMATURE GRANULOCYTES #: 0.03 E9/L
IMMATURE GRANULOCYTES %: 0.4 % (ref 0–5)
LYMPHOCYTES ABSOLUTE: 1.86 E9/L (ref 1.5–4)
LYMPHOCYTES RELATIVE PERCENT: 25.4 % (ref 20–42)
MCH RBC QN AUTO: 25.6 PG (ref 26–35)
MCHC RBC AUTO-ENTMCNC: 31.7 % (ref 32–34.5)
MCV RBC AUTO: 80.9 FL (ref 80–99.9)
MONOCYTES ABSOLUTE: 0.47 E9/L (ref 0.1–0.95)
MONOCYTES RELATIVE PERCENT: 6.4 % (ref 2–12)
NEUTROPHILS ABSOLUTE: 4.72 E9/L (ref 1.8–7.3)
NEUTROPHILS RELATIVE PERCENT: 64.6 % (ref 43–80)
PDW BLD-RTO: 16.8 FL (ref 11.5–15)
PLATELET # BLD: 327 E9/L (ref 130–450)
PMV BLD AUTO: 12 FL (ref 7–12)
POTASSIUM SERPL-SCNC: 4.3 MMOL/L (ref 3.5–5)
RBC # BLD: 4.76 E12/L (ref 3.5–5.5)
SODIUM BLD-SCNC: 132 MMOL/L (ref 132–146)
TOTAL PROTEIN: 8.3 G/DL (ref 6.4–8.3)
WBC # BLD: 7.3 E9/L (ref 4.5–11.5)

## 2021-01-08 PROCEDURE — 99214 OFFICE O/P EST MOD 30 MIN: CPT | Performed by: INTERNAL MEDICINE

## 2021-01-08 RX ORDER — DROSPIRENONE 4 MG/1
1 TABLET, FILM COATED ORAL DAILY
COMMUNITY

## 2021-01-08 RX ORDER — LISINOPRIL 10 MG/1
10 TABLET ORAL DAILY
Qty: 90 TABLET | Refills: 1 | Status: SHIPPED
Start: 2021-01-08 | End: 2021-04-09 | Stop reason: SDUPTHER

## 2021-01-08 RX ORDER — AMLODIPINE BESYLATE 5 MG/1
5 TABLET ORAL DAILY
Qty: 90 TABLET | Refills: 1 | Status: SHIPPED
Start: 2021-01-08 | End: 2021-04-09 | Stop reason: SDUPTHER

## 2021-01-08 RX ORDER — LISINOPRIL AND HYDROCHLOROTHIAZIDE 25; 20 MG/1; MG/1
1 TABLET ORAL DAILY
Qty: 90 TABLET | Refills: 1 | Status: SHIPPED
Start: 2021-01-08 | End: 2021-04-09 | Stop reason: SDUPTHER

## 2021-01-08 ASSESSMENT — PATIENT HEALTH QUESTIONNAIRE - PHQ9
2. FEELING DOWN, DEPRESSED OR HOPELESS: 0
SUM OF ALL RESPONSES TO PHQ QUESTIONS 1-9: 0
SUM OF ALL RESPONSES TO PHQ9 QUESTIONS 1 & 2: 0
SUM OF ALL RESPONSES TO PHQ QUESTIONS 1-9: 0
1. LITTLE INTEREST OR PLEASURE IN DOING THINGS: 0

## 2021-01-08 NOTE — PROGRESS NOTES
Breast Exam - GYN-dr ruiz  Mammogram - GYN  EKG - 1/14, 5/16  Medical Problems:  Hypertension  Fatty Liver - ultrasound 5/14  elevated LFT's - pos hep B atb-has had vaccine  upper lip hemangioma, preeclampsia, Sleep Apnea currently on CPAP and compliant with this  Anxiety-currently on medication  FH:  no siblings. Father:  Hypertension, Diverticulosis. Mother:  osteopenia. Maternal Grandmother:  Breast Cancer. SH:  Marital: . Abuse: physical therapist.  Personal Habits: Cigarette Use: Does Not Smoke. Alcohol: Occasionally consumes alcohol           Current Outpatient Medications:     Drospirenone (SLYND) 4 MG TABS, Take 1 tablet by mouth daily, Disp: , Rfl:     amLODIPine (NORVASC) 5 MG tablet, Take 1 tablet by mouth daily, Disp: 90 tablet, Rfl: 1    lisinopril (PRINIVIL;ZESTRIL) 10 MG tablet, Take 1 tablet by mouth daily, Disp: 90 tablet, Rfl: 1    lisinopril-hydroCHLOROthiazide (PRINZIDE;ZESTORETIC) 20-25 MG per tablet, Take 1 tablet by mouth daily, Disp: 90 tablet, Rfl: 1    sertraline (ZOLOFT) 50 MG tablet, 1/2 pill po daily x 1 week then 1 pill daily, Disp: 90 tablet, Rfl: 1    fexofenadine (ALLEGRA) 180 MG tablet, Take 1 tablet by mouth daily, Disp: 90 tablet, Rfl: 2    ketorolac (ACULAR) 0.5 % ophthalmic solution, Place 1 drop into both eyes 3 times daily, Disp: 1 Bottle, Rfl: 3    diphenhydrAMINE HCl (BENADRYL PO), Take 25 mg by mouth nightly 3 tablets every night, Disp: , Rfl:     PSYLLIUM PO, Take by mouth, Disp: , Rfl:     ibuprofen (ADVIL;MOTRIN) 200 MG tablet, Take 200 mg by mouth every 6 hours as needed for Pain, Disp: , Rfl:   No Known Allergies    Past Medical History:   Diagnosis Date    Class 2 obesity due to excess calories without serious comorbidity with body mass index (BMI) of 37.0 to 37.9 in adult 5/31/2019    Elevated LFTs     pos.  Hep B atb- has had vaccine    Essential hypertension 5/31/2019    Fatty liver 05/2014    ultrasound    Hemangioma of lip     uppper  Hypertension     Liver function test abnormality 5/31/2019    KATARZYNA (obstructive sleep apnea) 5/31/2019    Preeclampsia     Sleep apnea      No past surgical history on file.   Family History   Problem Relation Age of Onset    Hypertension Mother     Atrial Fibrillation Father     Hypertension Father      Social History     Socioeconomic History    Marital status:      Spouse name: Not on file    Number of children: Not on file    Years of education: Not on file    Highest education level: Not on file   Occupational History    Not on file   Social Needs    Financial resource strain: Not on file    Food insecurity     Worry: Not on file     Inability: Not on file    Transportation needs     Medical: Not on file     Non-medical: Not on file   Tobacco Use    Smoking status: Never Smoker    Smokeless tobacco: Never Used   Substance and Sexual Activity    Alcohol use: Never     Frequency: Never    Drug use: Never    Sexual activity: Not on file   Lifestyle    Physical activity     Days per week: Not on file     Minutes per session: Not on file    Stress: Not on file   Relationships    Social connections     Talks on phone: Not on file     Gets together: Not on file     Attends Yazidi service: Not on file     Active member of club or organization: Not on file     Attends meetings of clubs or organizations: Not on file     Relationship status: Not on file    Intimate partner violence     Fear of current or ex partner: Not on file     Emotionally abused: Not on file     Physically abused: Not on file     Forced sexual activity: Not on file   Other Topics Concern    Not on file   Social History Narrative    Not on file       Vitals:    01/08/21 0759   BP: 122/78   Pulse: 85   Temp: 98.1 °F (36.7 °C)   TempSrc: Temporal   SpO2: 99%   Weight: 231 lb 12.8 oz (105.1 kg)   Height: 5' 6\" (1.676 m)       Physical Exam

## 2021-01-08 NOTE — TELEPHONE ENCOUNTER
Anemia status showing some improvement. Liver enzymes slightly improved. Blood sugar is elevated again. As we discussed today weight loss is the key to fatty liver and her blood sugar.

## 2021-04-09 ENCOUNTER — OFFICE VISIT (OUTPATIENT)
Dept: FAMILY MEDICINE CLINIC | Age: 51
End: 2021-04-09
Payer: COMMERCIAL

## 2021-04-09 VITALS
HEIGHT: 66 IN | HEART RATE: 88 BPM | DIASTOLIC BLOOD PRESSURE: 68 MMHG | SYSTOLIC BLOOD PRESSURE: 118 MMHG | TEMPERATURE: 98.3 F | WEIGHT: 231.6 LBS | BODY MASS INDEX: 37.22 KG/M2 | OXYGEN SATURATION: 97 %

## 2021-04-09 DIAGNOSIS — R73.9 HYPERGLYCEMIA: ICD-10-CM

## 2021-04-09 DIAGNOSIS — E66.3 OVER WEIGHT: ICD-10-CM

## 2021-04-09 DIAGNOSIS — K76.0 FATTY LIVER: ICD-10-CM

## 2021-04-09 DIAGNOSIS — R73.9 HYPERGLYCEMIA: Primary | ICD-10-CM

## 2021-04-09 DIAGNOSIS — G47.33 OSA (OBSTRUCTIVE SLEEP APNEA): ICD-10-CM

## 2021-04-09 DIAGNOSIS — I10 ESSENTIAL HYPERTENSION: ICD-10-CM

## 2021-04-09 DIAGNOSIS — F41.9 ANXIETY: ICD-10-CM

## 2021-04-09 LAB
ALBUMIN SERPL-MCNC: 4.4 G/DL (ref 3.5–5.2)
ALP BLD-CCNC: 56 U/L (ref 35–104)
ALT SERPL-CCNC: 45 U/L (ref 0–32)
ANION GAP SERPL CALCULATED.3IONS-SCNC: 16 MMOL/L (ref 7–16)
AST SERPL-CCNC: 46 U/L (ref 0–31)
BACTERIA: ABNORMAL /HPF
BASOPHILS ABSOLUTE: 0.09 E9/L (ref 0–0.2)
BASOPHILS RELATIVE PERCENT: 1.2 % (ref 0–2)
BILIRUB SERPL-MCNC: 0.3 MG/DL (ref 0–1.2)
BILIRUBIN URINE: NEGATIVE
BLOOD, URINE: ABNORMAL
BUN BLDV-MCNC: 17 MG/DL (ref 6–20)
CALCIUM SERPL-MCNC: 10.1 MG/DL (ref 8.6–10.2)
CHLORIDE BLD-SCNC: 106 MMOL/L (ref 98–107)
CHOLESTEROL, TOTAL: 191 MG/DL (ref 0–199)
CLARITY: ABNORMAL
CO2: 18 MMOL/L (ref 22–29)
COLOR: YELLOW
CREAT SERPL-MCNC: 0.8 MG/DL (ref 0.5–1)
EOSINOPHILS ABSOLUTE: 0.19 E9/L (ref 0.05–0.5)
EOSINOPHILS RELATIVE PERCENT: 2.5 % (ref 0–6)
EPITHELIAL CELLS, UA: ABNORMAL /HPF
GFR AFRICAN AMERICAN: >60
GFR NON-AFRICAN AMERICAN: >60 ML/MIN/1.73
GLUCOSE BLD-MCNC: 123 MG/DL (ref 74–99)
GLUCOSE URINE: NEGATIVE MG/DL
HBA1C MFR BLD: 6.3 % (ref 4–5.6)
HCT VFR BLD CALC: 41.7 % (ref 34–48)
HDLC SERPL-MCNC: 41 MG/DL
HEMOGLOBIN: 13 G/DL (ref 11.5–15.5)
IMMATURE GRANULOCYTES #: 0.02 E9/L
IMMATURE GRANULOCYTES %: 0.3 % (ref 0–5)
KETONES, URINE: NEGATIVE MG/DL
LDL CHOLESTEROL CALCULATED: 131 MG/DL (ref 0–99)
LEUKOCYTE ESTERASE, URINE: ABNORMAL
LYMPHOCYTES ABSOLUTE: 1.97 E9/L (ref 1.5–4)
LYMPHOCYTES RELATIVE PERCENT: 25.5 % (ref 20–42)
MCH RBC QN AUTO: 28.3 PG (ref 26–35)
MCHC RBC AUTO-ENTMCNC: 31.2 % (ref 32–34.5)
MCV RBC AUTO: 90.8 FL (ref 80–99.9)
MONOCYTES ABSOLUTE: 0.5 E9/L (ref 0.1–0.95)
MONOCYTES RELATIVE PERCENT: 6.5 % (ref 2–12)
NEUTROPHILS ABSOLUTE: 4.96 E9/L (ref 1.8–7.3)
NEUTROPHILS RELATIVE PERCENT: 64 % (ref 43–80)
NITRITE, URINE: NEGATIVE
PDW BLD-RTO: 14.7 FL (ref 11.5–15)
PH UA: 5.5 (ref 5–9)
PLATELET # BLD: 290 E9/L (ref 130–450)
PMV BLD AUTO: 12.1 FL (ref 7–12)
POTASSIUM SERPL-SCNC: 4.5 MMOL/L (ref 3.5–5)
PROTEIN UA: NEGATIVE MG/DL
RBC # BLD: 4.59 E12/L (ref 3.5–5.5)
RBC UA: ABNORMAL /HPF (ref 0–2)
SODIUM BLD-SCNC: 140 MMOL/L (ref 132–146)
SPECIFIC GRAVITY UA: 1.02 (ref 1–1.03)
TOTAL PROTEIN: 8.2 G/DL (ref 6.4–8.3)
TRIGL SERPL-MCNC: 97 MG/DL (ref 0–149)
UROBILINOGEN, URINE: 0.2 E.U./DL
VLDLC SERPL CALC-MCNC: 19 MG/DL
WBC # BLD: 7.7 E9/L (ref 4.5–11.5)
WBC UA: ABNORMAL /HPF (ref 0–5)

## 2021-04-09 PROCEDURE — 99214 OFFICE O/P EST MOD 30 MIN: CPT | Performed by: INTERNAL MEDICINE

## 2021-04-09 RX ORDER — LISINOPRIL AND HYDROCHLOROTHIAZIDE 25; 20 MG/1; MG/1
1 TABLET ORAL DAILY
Qty: 90 TABLET | Refills: 1 | Status: SHIPPED
Start: 2021-04-09 | End: 2021-08-13 | Stop reason: SDUPTHER

## 2021-04-10 ENCOUNTER — TELEPHONE (OUTPATIENT)
Dept: FAMILY MEDICINE CLINIC | Age: 51
End: 2021-04-10

## 2021-04-10 RX ORDER — AMLODIPINE BESYLATE 5 MG/1
5 TABLET ORAL DAILY
Qty: 90 TABLET | Refills: 1 | Status: SHIPPED
Start: 2021-04-10 | End: 2021-08-13 | Stop reason: SDUPTHER

## 2021-04-10 RX ORDER — LISINOPRIL 10 MG/1
10 TABLET ORAL DAILY
Qty: 90 TABLET | Refills: 1 | Status: SHIPPED
Start: 2021-04-10 | End: 2021-08-13 | Stop reason: SDUPTHER

## 2021-04-11 NOTE — PROGRESS NOTES
[address] [facility] [address] [facility]     21  Raffi Rodriguez : 1970 Sex: female  Age: 44 y.o. Chief Complaint   Patient presents with    Hypertension       HPI  Patient presents today for follow-up visit on her medical problems. She still is not checking blood pressures as I have asked her. She only brings in 3 blood pressure numbers since I saw her last which I told her is absolutely not enough and she needs to start checking more frequently especially in view of the difficulty we have had in try to control her blood pressure. She is doing well from an anxiety/stress standpoint on her SSRI. She continues to use her CPAP for her obstructive sleep apnea successfully. Weight unfortunately is unchanged. She does tell me she is trying to watch her diet and is walking several times a week. We also did discuss her blood sugars which have been very marginal.  I told her she will be diabetic if she does not lose weight. Fatty liver associated with the above also          Review of Systems     Constitutional: Negative for activity change, appetite change, chills, diaphoresis, fatigue, fever and unexpected weight change.        Overweight   HENT: Negative for congestion, ear pain, hearing loss, postnasal drip, rhinorrhea and sinus pain.    Eyes: Negative.    Respiratory: Negative for cough, shortness of breath and wheezing.    Cardiovascular: Negative for chest pain, palpitations and leg swelling. Gastrointestinal: Negative for abdominal pain, blood in stool, constipation, diarrhea, nausea and vomiting. Endocrine: Negative.    Genitourinary: Negative for difficulty urinating, dysuria, frequency, hematuria and urgency. Musculoskeletal: Negative for arthralgias, back pain, gait problem and myalgias. Skin: Negative.    Allergic/Immunologic: Negative for environmental allergies and immunocompromised state.    Neurological: Negative for dizziness, weakness, light-headedness, and headaches.  She has positive for hand paresthesia-unchanged  Hematological: Negative.    Psychiatric/Behavioral: Doing well on the sertraline for her anxiety          REST OF PERTINENT ROS GONE OVER AND WAS NEGATIVE. PMH  Health Maintenance:  Pelvic/Pap Exam - GYN  Breast Exam - GYN-dr riuz  Mammogram - GYN  EKG - 1/14, 5/16  Medical Problems:  Hypertension  Fatty Liver - ultrasound 5/14  elevated LFT's - pos hep B atb-has had vaccine  upper lip hemangioma, preeclampsia, Sleep Apnea currently on CPAP and compliant with this  Anxiety-currently on medication  FH:  no siblings. Father:  Hypertension, Diverticulosis. Mother:  osteopenia. Maternal Grandmother:  Breast Cancer. SH:  Marital: . , physical therapist.  Personal Habits: Cigarette Use: Does Not Smoke. Alcohol: Occasionally consumes alcohol             Current Outpatient Medications:     amLODIPine (NORVASC) 5 MG tablet, Take 1 tablet by mouth daily, Disp: 90 tablet, Rfl: 1    lisinopril (PRINIVIL;ZESTRIL) 10 MG tablet, Take 1 tablet by mouth daily, Disp: 90 tablet, Rfl: 1    sertraline (ZOLOFT) 50 MG tablet, 1/2 pill po daily x 1 week then 1 pill daily, Disp: 90 tablet, Rfl: 1    lisinopril-hydroCHLOROthiazide (PRINZIDE;ZESTORETIC) 20-25 MG per tablet, Take 1 tablet by mouth daily, Disp: 90 tablet, Rfl: 1    Drospirenone (SLYND) 4 MG TABS, Take 1 tablet by mouth daily, Disp: , Rfl:     fexofenadine (ALLEGRA) 180 MG tablet, Take 1 tablet by mouth daily, Disp: 90 tablet, Rfl: 2    ketorolac (ACULAR) 0.5 % ophthalmic solution, Place 1 drop into both eyes 3 times daily, Disp: 1 Bottle, Rfl: 3    diphenhydrAMINE HCl (BENADRYL PO), Take 25 mg by mouth nightly 3 tablets every night, Disp: , Rfl:     PSYLLIUM PO, Take by mouth, Disp: , Rfl:     ibuprofen (ADVIL;MOTRIN) 200 MG tablet, Take 200 mg by mouth every 6 hours as needed for Pain, Disp: , Rfl:   No Known Allergies    Past Medical History:   Diagnosis Date    Class 2 obesity due to excess calories without serious comorbidity with body mass index (BMI) of 37.0 to 37.9 in adult 5/31/2019    Elevated LFTs     pos. Hep B atb- has had vaccine    Essential hypertension 5/31/2019    Fatty liver 05/2014    ultrasound    Hemangioma of lip     uppper    Hypertension     Liver function test abnormality 5/31/2019    KATARZYNA (obstructive sleep apnea) 5/31/2019    Preeclampsia     Sleep apnea      No past surgical history on file.   Family History   Problem Relation Age of Onset    Hypertension Mother     Atrial Fibrillation Father     Hypertension Father      Social History     Socioeconomic History    Marital status:      Spouse name: Not on file    Number of children: Not on file    Years of education: Not on file    Highest education level: Not on file   Occupational History    Not on file   Social Needs    Financial resource strain: Not on file    Food insecurity     Worry: Not on file     Inability: Not on file    Transportation needs     Medical: Not on file     Non-medical: Not on file   Tobacco Use    Smoking status: Never Smoker    Smokeless tobacco: Never Used   Substance and Sexual Activity    Alcohol use: Never     Frequency: Never    Drug use: Never    Sexual activity: Not on file   Lifestyle    Physical activity     Days per week: Not on file     Minutes per session: Not on file    Stress: Not on file   Relationships    Social connections     Talks on phone: Not on file     Gets together: Not on file     Attends Moravian service: Not on file     Active member of club or organization: Not on file     Attends meetings of clubs or organizations: Not on file     Relationship status: Not on file    Intimate partner violence     Fear of current or ex partner: Not on file     Emotionally abused: Not on file     Physically abused: Not on file     Forced sexual activity: Not on file   Other Topics Concern    Not on file   Social History Narrative    Not on file       Vitals:    04/09/21 0734   BP: 118/68   Pulse: 88   Temp: 98.3 °F (36.8 °C)   TempSrc: Temporal   SpO2: 97%   Weight: 231 lb 9.6 oz (105.1 kg)   Height: 5' 6\" (1.676 m)       Physical Exam    Constitutional: She is oriented to person, place, and time. She appears well-developed and well-nourished. No distress. Neck: Normal range of motion. Neck supple. No thyromegaly present. Cardiovascular: Normal rate, regular rhythm, normal heart sounds and intact distal pulses. Exam reveals no gallop and no friction rub.   No murmur heard. Pulmonary/Chest: Effort normal and breath sounds normal. No respiratory distress. She has no wheezes. She has no rales. Abdominal: Soft. Bowel sounds are normal. She exhibits no distension and no mass. There is no tenderness. Musculoskeletal: Normal range of motion. Psychiatric: She has a normal mood and affect. Her behavior is normal. Judgment and thought content normal.   Nursing note and vitals reviewed.         Assessment and Plan:  Tami Sosa was seen today for hypertension. Diagnoses and all orders for this visit:    Hyperglycemia  -     Hemoglobin A1C; Future  Marginal control    Essential hypertension  -     lisinopril-hydroCHLOROthiazide (PRINZIDE;ZESTORETIC) 20-25 MG per tablet; Take 1 tablet by mouth daily  -     Comprehensive Metabolic Panel; Future  -     CBC Auto Differential; Future  -     Lipid Panel; Future  -     Urinalysis; Future  -     amLODIPine (NORVASC) 5 MG tablet; Take 1 tablet by mouth daily  -     lisinopril (PRINIVIL;ZESTRIL) 10 MG tablet; Take 1 tablet by mouth daily  Still not ideal    Anxiety  Stable on SSRI    KATARZYNA (obstructive sleep apnea)  Stable on CPAP    Fatty liver    Over weight    Other orders  -     sertraline (ZOLOFT) 50 MG tablet; 1/2 pill po daily x 1 week then 1 pill daily    Plan: I will see her back in 4 months and as needed. Blood work today to monitor disease progression and medication use. Weight loss attempts.   Start monitoring blood pressure much more

## 2021-06-07 DIAGNOSIS — T78.40XD ALLERGY, SUBSEQUENT ENCOUNTER: ICD-10-CM

## 2021-06-07 RX ORDER — KETOROLAC TROMETHAMINE 5 MG/ML
1 SOLUTION OPHTHALMIC 3 TIMES DAILY PRN
Qty: 1 BOTTLE | Refills: 3 | Status: SHIPPED
Start: 2021-06-07 | End: 2022-10-28

## 2021-06-07 NOTE — TELEPHONE ENCOUNTER
Last Appointment:  4/9/2021  Future Appointments   Date Time Provider Richie Michel   8/13/2021  8:30 AM Arin Langley  [address]

## 2021-08-13 ENCOUNTER — OFFICE VISIT (OUTPATIENT)
Dept: FAMILY MEDICINE CLINIC | Age: 51
End: 2021-08-13
Payer: COMMERCIAL

## 2021-08-13 ENCOUNTER — TELEPHONE (OUTPATIENT)
Dept: FAMILY MEDICINE CLINIC | Age: 51
End: 2021-08-13

## 2021-08-13 VITALS
TEMPERATURE: 98 F | BODY MASS INDEX: 36.93 KG/M2 | SYSTOLIC BLOOD PRESSURE: 126 MMHG | DIASTOLIC BLOOD PRESSURE: 80 MMHG | WEIGHT: 229.8 LBS | OXYGEN SATURATION: 98 % | HEIGHT: 66 IN | HEART RATE: 90 BPM

## 2021-08-13 DIAGNOSIS — R73.9 HYPERGLYCEMIA: ICD-10-CM

## 2021-08-13 DIAGNOSIS — G47.33 OSA (OBSTRUCTIVE SLEEP APNEA): ICD-10-CM

## 2021-08-13 DIAGNOSIS — I10 ESSENTIAL HYPERTENSION: ICD-10-CM

## 2021-08-13 DIAGNOSIS — E66.3 OVER WEIGHT: ICD-10-CM

## 2021-08-13 DIAGNOSIS — K76.0 FATTY LIVER: ICD-10-CM

## 2021-08-13 DIAGNOSIS — F41.9 ANXIETY: ICD-10-CM

## 2021-08-13 PROCEDURE — 99214 OFFICE O/P EST MOD 30 MIN: CPT | Performed by: INTERNAL MEDICINE

## 2021-08-13 RX ORDER — LISINOPRIL 10 MG/1
10 TABLET ORAL DAILY
Qty: 90 TABLET | Refills: 1 | Status: SHIPPED
Start: 2021-08-13 | End: 2021-12-10 | Stop reason: SDUPTHER

## 2021-08-13 RX ORDER — AMLODIPINE BESYLATE 5 MG/1
5 TABLET ORAL DAILY
Qty: 90 TABLET | Refills: 1 | Status: SHIPPED
Start: 2021-08-13 | End: 2021-12-10 | Stop reason: SDUPTHER

## 2021-08-13 RX ORDER — LISINOPRIL AND HYDROCHLOROTHIAZIDE 25; 20 MG/1; MG/1
1 TABLET ORAL DAILY
Qty: 90 TABLET | Refills: 1 | Status: SHIPPED
Start: 2021-08-13 | End: 2021-12-10 | Stop reason: SDUPTHER

## 2021-08-13 SDOH — ECONOMIC STABILITY: FOOD INSECURITY: WITHIN THE PAST 12 MONTHS, YOU WORRIED THAT YOUR FOOD WOULD RUN OUT BEFORE YOU GOT MONEY TO BUY MORE.: NEVER TRUE

## 2021-08-13 SDOH — ECONOMIC STABILITY: FOOD INSECURITY: WITHIN THE PAST 12 MONTHS, THE FOOD YOU BOUGHT JUST DIDN'T LAST AND YOU DIDN'T HAVE MONEY TO GET MORE.: NEVER TRUE

## 2021-08-13 ASSESSMENT — SOCIAL DETERMINANTS OF HEALTH (SDOH): HOW HARD IS IT FOR YOU TO PAY FOR THE VERY BASICS LIKE FOOD, HOUSING, MEDICAL CARE, AND HEATING?: NOT HARD AT ALL

## 2021-08-13 NOTE — PROGRESS NOTES
[address] [facility] [address] [facility]     21  Brenda Mccrary : 1970 Sex: female  Age: 46 y.o. Chief Complaint   Patient presents with    Hypertension     4 months; recheck urine    Hyperglycemia       HPI    Patient presents today for 4-month follow-up visit on her medical problems. In general she is been doing reasonably well. Weight is down a couple pounds from last visit. Blood pressure does look to be better range although still at the upper end of normal it does look improved. She is watching her salt intake and attempting some dietary measures. Depression has been good on SSRI. Obstructive sleep apnea is stable on her CPAP which she is tolerating. We did discuss blood sugars which we have been monitoring closely. She is certainly prediabetic at this point. I told her weight loss is imperative for that and for the fatty liver situation. As well as blood pressure. Review of Systems     Constitutional: Negative for activity change, appetite change, chills, diaphoresis, fatigue, fever and unexpected weight change.        Overweight   HENT: Negative for congestion, ear pain, hearing loss, postnasal drip, rhinorrhea and sinus pain.    Eyes: Negative.    Respiratory: Negative for cough, shortness of breath and wheezing.    Cardiovascular: Negative for chest pain, palpitations and leg swelling. Gastrointestinal: Negative for abdominal pain, blood in stool, constipation, diarrhea, nausea and vomiting. Endocrine: Negative.    Genitourinary: Negative for difficulty urinating, dysuria, frequency, hematuria and urgency. Musculoskeletal: Negative for arthralgias, back pain, gait problem and myalgias. Skin: Negative.    Allergic/Immunologic: Negative for environmental allergies and immunocompromised state.    Neurological: Negative for dizziness, weakness, light-headedness, and headaches.  She has positive for hand paresthesia-unchanged  Hematological: Negative.    Psychiatric/Behavioral: Doing well on the  Elevated LFTs     pos. Hep B atb- has had vaccine    Essential hypertension 5/31/2019    Fatty liver 05/2014    ultrasound    Hemangioma of lip     uppper    Hypertension     Liver function test abnormality 5/31/2019    KATARZNYA (obstructive sleep apnea) 5/31/2019    Preeclampsia     Sleep apnea      No past surgical history on file. Family History   Problem Relation Age of Onset    Hypertension Mother     Atrial Fibrillation Father     Hypertension Father      Social History     Socioeconomic History    Marital status:      Spouse name: Not on file    Number of children: Not on file    Years of education: Not on file    Highest education level: Not on file   Occupational History    Not on file   Tobacco Use    Smoking status: Never Smoker    Smokeless tobacco: Never Used   Substance and Sexual Activity    Alcohol use: Never    Drug use: Never    Sexual activity: Not on file   Other Topics Concern    Not on file   Social History Narrative    Not on file     Social Determinants of Health     Financial Resource Strain: Low Risk     Difficulty of Paying Living Expenses: Not hard at all   Food Insecurity: No Food Insecurity    Worried About Running Out of Food in the Last Year: Never true    [address] in the Last Year: Never true   Transportation Needs:     Lack of Transportation (Medical):      Lack of Transportation (Non-Medical):    Physical Activity:     Days of Exercise per Week:     Minutes of Exercise per Session:    Stress:     Feeling of Stress :    Social Connections:     Frequency of Communication with Friends and Family:     Frequency of Social Gatherings with Friends and Family:     Attends Zoroastrian Services:     Active Member of Clubs or Organizations:     Attends Club or Organization Meetings:     Marital Status:    Intimate Partner Violence:     Fear of Current or Ex-Partner:     Emotionally Abused:     Physically Abused:     Sexually Abused: Vitals:    08/13/21 0839   BP: 126/80   Pulse: 90   Temp: 98 °F (36.7 °C)   TempSrc: Temporal   SpO2: 98%   Weight: 229 lb 12.8 oz (104.2 kg)   Height: 5' 6\" (1.676 m)       Physical Exam  Constitutional: She is oriented to person, place, and time. She appears well-developed and well-nourished. No distress. Overweight  Neck: Normal range of motion. Neck supple. No thyromegaly present. Cardiovascular: Normal rate, regular rhythm, normal heart sounds and intact distal pulses. Exam reveals no gallop and no friction rub.   No murmur heard. Pulmonary/Chest: Effort normal and breath sounds normal. No respiratory distress. She has no wheezes. She has no rales. Abdominal: Soft. Bowel sounds are normal. She exhibits no distension and no mass. There is no tenderness. Musculoskeletal: Normal range of motion.    Psychiatric: She has a normal mood and affect. Her behavior is normal. Judgment and thought content normal.   Nursing note and vitals reviewed.             Assessment and Plan:  Shannan Ariza was seen today for hypertension and hyperglycemia. Diagnoses and all orders for this visit:    Essential hypertension  -     lisinopril-hydroCHLOROthiazide (PRINZIDE;ZESTORETIC) 20-25 MG per tablet; Take 1 tablet by mouth daily  -     amLODIPine (NORVASC) 5 MG tablet; Take 1 tablet by mouth daily  -     lisinopril (PRINIVIL;ZESTRIL) 10 MG tablet; Take 1 tablet by mouth daily  -     Comprehensive Metabolic Panel; Future  -     CBC Auto Differential; Future  -     Lipid Panel; Future  -     Magnesium; Future  -     Urinalysis; Future    KATARZYNA (obstructive sleep apnea)    Fatty liver    Over weight    Hyperglycemia  -     Hemoglobin A1C; Future    Anxiety    Other orders  -     sertraline (ZOLOFT) 50 MG tablet; 1/2 pill po daily x 1 week then 1 pill daily    Plan: I will see her back in 4 months and as needed. Blood work today to monitor disease progression and medication use. Prescription management performed.  Continue weight loss attempts. See above body report. Notify us of problems in the interim. Return in about 4 months (around 12/13/2021). Seen By:  Case Carreon MD      *Document was created using voice recognition software. Note was reviewed however may contain grammatical errors.

## 2021-08-13 NOTE — TELEPHONE ENCOUNTER
Once again blood sugars are extremely marginal.  Liver enzymes are up slightly further. We discussed weight and that is where it all centers right now. Please put in note field to consider FibroScan of liver when I see her next.

## 2021-12-10 ENCOUNTER — OFFICE VISIT (OUTPATIENT)
Dept: FAMILY MEDICINE CLINIC | Age: 51
End: 2021-12-10
Payer: COMMERCIAL

## 2021-12-10 ENCOUNTER — TELEPHONE (OUTPATIENT)
Dept: FAMILY MEDICINE CLINIC | Age: 51
End: 2021-12-10

## 2021-12-10 VITALS
SYSTOLIC BLOOD PRESSURE: 124 MMHG | HEIGHT: 66 IN | HEART RATE: 85 BPM | DIASTOLIC BLOOD PRESSURE: 80 MMHG | WEIGHT: 234 LBS | OXYGEN SATURATION: 98 % | BODY MASS INDEX: 37.61 KG/M2 | TEMPERATURE: 99.5 F

## 2021-12-10 DIAGNOSIS — R74.01 ELEVATED LIVER TRANSAMINASE LEVEL: Primary | ICD-10-CM

## 2021-12-10 DIAGNOSIS — E66.3 OVER WEIGHT: ICD-10-CM

## 2021-12-10 DIAGNOSIS — I10 ESSENTIAL HYPERTENSION: ICD-10-CM

## 2021-12-10 DIAGNOSIS — K76.0 FATTY LIVER: ICD-10-CM

## 2021-12-10 DIAGNOSIS — R74.01 ELEVATED LIVER TRANSAMINASE LEVEL: ICD-10-CM

## 2021-12-10 DIAGNOSIS — K76.0 FATTY LIVER: Primary | ICD-10-CM

## 2021-12-10 DIAGNOSIS — R73.9 HYPERGLYCEMIA: ICD-10-CM

## 2021-12-10 DIAGNOSIS — G47.33 OSA (OBSTRUCTIVE SLEEP APNEA): ICD-10-CM

## 2021-12-10 LAB
ALBUMIN SERPL-MCNC: 4.3 G/DL (ref 3.5–5.2)
ALP BLD-CCNC: 52 U/L (ref 35–104)
ALT SERPL-CCNC: 59 U/L (ref 0–32)
ANION GAP SERPL CALCULATED.3IONS-SCNC: 16 MMOL/L (ref 7–16)
AST SERPL-CCNC: 52 U/L (ref 0–31)
BILIRUB SERPL-MCNC: 0.3 MG/DL (ref 0–1.2)
BUN BLDV-MCNC: 16 MG/DL (ref 6–20)
CALCIUM SERPL-MCNC: 9.7 MG/DL (ref 8.6–10.2)
CHLORIDE BLD-SCNC: 102 MMOL/L (ref 98–107)
CO2: 20 MMOL/L (ref 22–29)
CREAT SERPL-MCNC: 0.8 MG/DL (ref 0.5–1)
GFR AFRICAN AMERICAN: >60
GFR NON-AFRICAN AMERICAN: >60 ML/MIN/1.73
GLUCOSE BLD-MCNC: 139 MG/DL (ref 74–99)
HBA1C MFR BLD: 6.2 % (ref 4–5.6)
POTASSIUM SERPL-SCNC: 4.3 MMOL/L (ref 3.5–5)
SODIUM BLD-SCNC: 138 MMOL/L (ref 132–146)
TOTAL PROTEIN: 8.8 G/DL (ref 6.4–8.3)

## 2021-12-10 PROCEDURE — 99214 OFFICE O/P EST MOD 30 MIN: CPT | Performed by: INTERNAL MEDICINE

## 2021-12-10 RX ORDER — AMLODIPINE BESYLATE 5 MG/1
5 TABLET ORAL DAILY
Qty: 90 TABLET | Refills: 1 | Status: SHIPPED
Start: 2021-12-10 | End: 2022-10-28

## 2021-12-10 RX ORDER — LISINOPRIL AND HYDROCHLOROTHIAZIDE 25; 20 MG/1; MG/1
1 TABLET ORAL DAILY
Qty: 90 TABLET | Refills: 1 | Status: SHIPPED
Start: 2021-12-10 | End: 2022-08-26 | Stop reason: SDUPTHER

## 2021-12-10 RX ORDER — LISINOPRIL 10 MG/1
10 TABLET ORAL DAILY
Qty: 90 TABLET | Refills: 1 | Status: SHIPPED
Start: 2021-12-10 | End: 2022-08-26 | Stop reason: SDUPTHER

## 2021-12-10 NOTE — TELEPHONE ENCOUNTER
Patient's blood sugar 139 fasting. I think she is most likely a type II diabetic at this point. Would like to check 2 more fasting blood sugars by fingerstick over the next couple weeks. Secondly liver enzymes remain elevated and I think it is time to get her back to see Dr. Karla Chawla again. I have put a referral in.

## 2021-12-11 NOTE — PROGRESS NOTES
[address] [place]     21  Rylie Garrido : 1970 Sex: female  Age: 46 y.o. Chief Complaint   Patient presents with    Hypertension     4 months    Hyperglycemia       HPI    Patient presents today for 4-month follow-up on her medical problems. She tells me she has been trying to eat better and exercising 3-5 times per week and is currently starting weight watchers. Unfortunately weight is up another 5 pounds. We have been discussing this for some time now she has had elevated transaminases and fatty liver. She is probably going to need to see GI again for further evaluation. Blood pressures that she brings in are excellent on her antihypertensive medication. Depression/anxiety is good on her sertraline. We discussed elevated blood sugars as well I will check some numbers today. She is fasting. She looks to be prediabetic. Review of Systems     Constitutional: Negative for activity change, appetite change, chills, diaphoresis, fatigue, fever and unexpected weight change.        Overweight   HENT: Negative for congestion, ear pain, hearing loss, postnasal drip, rhinorrhea and sinus pain.    Eyes: Negative.    Respiratory: Negative for cough, shortness of breath and wheezing.    Cardiovascular: Negative for chest pain, palpitations and leg swelling. Gastrointestinal: Negative for abdominal pain, blood in stool, constipation, diarrhea, nausea and vomiting. Endocrine: Negative.    Genitourinary: Negative for difficulty urinating, dysuria, frequency, hematuria and urgency. Musculoskeletal: Negative for arthralgias, back pain, gait problem and myalgias. Skin: Negative.    Allergic/Immunologic: Negative for environmental allergies and immunocompromised state.    Neurological: Negative for dizziness, weakness, light-headedness, and headaches.  She has positive for hand paresthesia-unchanged  Hematological: Negative.    Psychiatric/Behavioral: Doing well on the sertraline for her anxiety             REST OF PERTINENT ROS GONE OVER AND WAS NEGATIVE. PMH  Health Maintenance:  Pelvic/Pap Exam - GYN  Breast Exam - GYN-dr ruiz  Mammogram - GYN  EKG - 1/14, 5/16  Medical Problems:  Hypertension  Fatty Liver - ultrasound 5/14  elevated LFT's - pos hep B atb-has had vaccine  upper lip hemangioma, preeclampsia, Sleep Apnea currently on CPAP and compliant with this  Anxiety-currently on medication  FH:  no siblings. Father:  Hypertension, Diverticulosis. Mother:  osteopenia. Maternal Grandmother:  Breast Cancer. SH:  Marital: . , physical therapist.  Personal Habits: Cigarette Use: Does Not Smoke. Alcohol: Occasionally consumes alcohol             Current Outpatient Medications:     sertraline (ZOLOFT) 50 MG tablet, 1/2 pill po daily x 1 week then 1 pill daily, Disp: 90 tablet, Rfl: 1    lisinopril-hydroCHLOROthiazide (PRINZIDE;ZESTORETIC) 20-25 MG per tablet, Take 1 tablet by mouth daily, Disp: 90 tablet, Rfl: 1    lisinopril (PRINIVIL;ZESTRIL) 10 MG tablet, Take 1 tablet by mouth daily, Disp: 90 tablet, Rfl: 1    amLODIPine (NORVASC) 5 MG tablet, Take 1 tablet by mouth daily, Disp: 90 tablet, Rfl: 1    ketorolac (ACULAR) 0.5 % ophthalmic solution, Place 1 drop into both eyes 3 times daily as needed (Allergy symptoms), Disp: 1 Bottle, Rfl: 3    Drospirenone (SLYND) 4 MG TABS, Take 1 tablet by mouth daily, Disp: , Rfl:     fexofenadine (ALLEGRA) 180 MG tablet, Take 1 tablet by mouth daily, Disp: 90 tablet, Rfl: 2    diphenhydrAMINE HCl (BENADRYL PO), Take 25 mg by mouth nightly 3 tablets every night, Disp: , Rfl:     PSYLLIUM PO, Take by mouth, Disp: , Rfl:     ibuprofen (ADVIL;MOTRIN) 200 MG tablet, Take 200 mg by mouth every 6 hours as needed for Pain, Disp: , Rfl:   No Known Allergies    Past Medical History:   Diagnosis Date    Class 2 obesity due to excess calories without serious comorbidity with body mass index (BMI) of 37.0 to 37.9 in adult 5/31/2019    Elevated LFTs     pos. Hep B atb- has had vaccine    Essential hypertension 5/31/2019    Fatty liver 05/2014    ultrasound    Hemangioma of lip     uppper    Hypertension     Liver function test abnormality 5/31/2019    KATARZYNA (obstructive sleep apnea) 5/31/2019    Preeclampsia     Sleep apnea      No past surgical history on file. Family History   Problem Relation Age of Onset    Hypertension Mother     Atrial Fibrillation Father     Hypertension Father      Social History     Socioeconomic History    Marital status:      Spouse name: Not on file    Number of children: Not on file    Years of education: Not on file    Highest education level: Not on file   Occupational History    Not on file   Tobacco Use    Smoking status: Never Smoker    Smokeless tobacco: Never Used   Substance and Sexual Activity    Alcohol use: Never    Drug use: Never    Sexual activity: Not on file   Other Topics Concern    Not on file   Social History Narrative    Not on file     Social Determinants of Health     Financial Resource Strain: Low Risk     Difficulty of Paying Living Expenses: Not hard at all   Food Insecurity: No Food Insecurity    Worried About Running Out of Food in the Last Year: Never true    [address] in the Last Year: Never true   Transportation Needs:     Lack of Transportation (Medical): Not on file    Lack of Transportation (Non-Medical):  Not on file   Physical Activity:     Days of Exercise per Week: Not on file    Minutes of Exercise per Session: Not on file   Stress:     Feeling of Stress : Not on file   Social Connections:     Frequency of Communication with Friends and Family: Not on file    Frequency of Social Gatherings with Friends and Family: Not on file    Attends Restorationist Services: Not on file    Active Member of Clubs or Organizations: Not on file    Attends Club or Organization Meetings: Not on file    Marital Status: Not on file   Intimate Partner Violence:     Fear of Current or Ex-Partner: Not on file    Emotionally Abused: Not on file    Physically Abused: Not on file    Sexually Abused: Not on file   Housing Stability:     Unable to Pay for Housing in the Last Year: Not on file    Number of [place] in the Last Year: Not on file    Unstable Housing in the Last Year: Not on file       Vitals:    12/10/21 0726   BP: 124/80   Pulse: 85   Temp: 99.5 °F (37.5 °C)   TempSrc: Temporal   SpO2: 98%   Weight: 234 lb (106.1 kg)   Height: 5' 6\" (1.676 m)       Physical Exam    Constitutional: She is oriented to person, place, and time. She appears well-developed and well-nourished. No distress. Overweight  Neck: Normal range of motion. Neck supple. No thyromegaly present. Cardiovascular: Normal rate, regular rhythm, normal heart sounds and intact distal pulses. Exam reveals no gallop and no friction rub.   No murmur heard. Pulmonary/Chest: Effort normal and breath sounds normal. No respiratory distress. She has no wheezes. She has no rales. Abdominal: Soft. Bowel sounds are normal. She exhibits no distension and no mass. There is no tenderness. Musculoskeletal: Normal range of motion.    Psychiatric: She has a normal mood and affect. Her behavior is normal. Judgment and thought content normal.   Nursing note and vitals reviewed.              Assessment and Plan:  Landry Salazar was seen today for hypertension and hyperglycemia. Diagnoses and all orders for this visit:    Fatty liver  -     US ORGAN ELASTOGRAPHY; Future    Elevated liver transaminase level  -     US ORGAN ELASTOGRAPHY; Future    Essential hypertension  -     Comprehensive Metabolic Panel; Future  -     lisinopril-hydroCHLOROthiazide (PRINZIDE;ZESTORETIC) 20-25 MG per tablet; Take 1 tablet by mouth daily  -     lisinopril (PRINIVIL;ZESTRIL) 10 MG tablet; Take 1 tablet by mouth daily  -     amLODIPine (NORVASC) 5 MG tablet;  Take 1 tablet by mouth daily    KATARZYNA (obstructive sleep apnea)    Over weight    Hyperglycemia  -     Hemoglobin A1C; Future    Other orders  -     sertraline (ZOLOFT) 50 MG tablet; 1/2 pill po daily x 1 week then 1 pill daily    Plan: Continue to monitor blood pressure closely. Blood work monitor disease progression and medication use. Prescription management performed. Weight loss attempts. I did set up liver ultrasound with elastography. We will set her back up with gastroenterology/Dr. Karla Chawla to evaluate liver situation again. We will do complete exam next visit. Look her colonoscopy status again next time I see her. Notify us with problems in the interim. Return in about 4 months (around 4/10/2022). Seen By:  Joshua Dowd MD      *Document was created using voice recognition software. Note was reviewed however may contain grammatical errors.

## 2021-12-15 NOTE — TELEPHONE ENCOUNTER
Called patient and notified her of her lab results, she already knew that they were horrible. She was agreeable to the fasting fingerstick sugar tests so the next 2 mondays she is going to come in and have them done. I also advised her about her liver enzymes and that she needs to get back to see Dr You Person. She is agreeable to this as well.

## 2021-12-20 ENCOUNTER — NURSE ONLY (OUTPATIENT)
Dept: FAMILY MEDICINE CLINIC | Age: 51
End: 2021-12-20
Payer: COMMERCIAL

## 2021-12-20 DIAGNOSIS — R73.9 HYPERGLYCEMIA: Primary | ICD-10-CM

## 2021-12-20 LAB
CHP ED QC CHECK: NORMAL
GLUCOSE BLD-MCNC: 123 MG/DL

## 2021-12-20 PROCEDURE — 82962 GLUCOSE BLOOD TEST: CPT | Performed by: INTERNAL MEDICINE

## 2021-12-20 PROCEDURE — 99999 PR OFFICE/OUTPT VISIT,PROCEDURE ONLY: CPT | Performed by: INTERNAL MEDICINE

## 2021-12-27 ENCOUNTER — TELEPHONE (OUTPATIENT)
Dept: FAMILY MEDICINE CLINIC | Age: 51
End: 2021-12-27

## 2021-12-27 ENCOUNTER — NURSE ONLY (OUTPATIENT)
Dept: FAMILY MEDICINE CLINIC | Age: 51
End: 2021-12-27
Payer: COMMERCIAL

## 2021-12-27 DIAGNOSIS — R73.9 HYPERGLYCEMIA: Primary | ICD-10-CM

## 2021-12-27 LAB
CHP ED QC CHECK: NORMAL
GLUCOSE BLD-MCNC: 118 MG/DL

## 2021-12-27 PROCEDURE — 82962 GLUCOSE BLOOD TEST: CPT | Performed by: INTERNAL MEDICINE

## 2021-12-27 PROCEDURE — 99999 PR OFFICE/OUTPT VISIT,PROCEDURE ONLY: CPT | Performed by: INTERNAL MEDICINE

## 2021-12-27 NOTE — TELEPHONE ENCOUNTER
Fasting blood sugars and hemoglobin A1c still in the prediabetic range but very close to the cutoff. Needs to really watch concentrated sweet intake as well as attempting to decrease carbohydrate intake and lose weight.

## 2021-12-27 NOTE — PROGRESS NOTES
Pt presents today for fasting fingerstick glucose. Her results today were 118. I advised her that I would let her know if you'd like to make any changes after reviewing her results.

## 2022-01-06 ENCOUNTER — TELEPHONE (OUTPATIENT)
Dept: FAMILY MEDICINE CLINIC | Age: 52
End: 2022-01-06

## 2022-01-06 DIAGNOSIS — R74.01 ELEVATED LIVER TRANSAMINASE LEVEL: ICD-10-CM

## 2022-01-06 DIAGNOSIS — K76.0 FATTY LIVER: Primary | ICD-10-CM

## 2022-01-07 NOTE — TELEPHONE ENCOUNTER
Ultrasound showing enlargement of the liver with fatty changes and mild fibrosis. With chronic liver enzyme elevations and the above findings I would like her to see gastroenterology/Dr. Magy Deal for an opinion.   Referral is in

## 2022-01-11 NOTE — TELEPHONE ENCOUNTER
I forgot that we had set her up with Dr. Raphael Broderick. Please cancel Dr. Nova Donovan consult. Have her keep appointment with Dr. Raphael Broderick.   Send copy of the liver ultrasound to Dr. Raphael Broderick please

## 2022-04-22 ENCOUNTER — OFFICE VISIT (OUTPATIENT)
Dept: FAMILY MEDICINE CLINIC | Age: 52
End: 2022-04-22
Payer: COMMERCIAL

## 2022-04-22 ENCOUNTER — TELEPHONE (OUTPATIENT)
Dept: FAMILY MEDICINE CLINIC | Age: 52
End: 2022-04-22

## 2022-04-22 VITALS
OXYGEN SATURATION: 97 % | WEIGHT: 232.2 LBS | SYSTOLIC BLOOD PRESSURE: 114 MMHG | BODY MASS INDEX: 37.32 KG/M2 | HEIGHT: 66 IN | TEMPERATURE: 98.4 F | HEART RATE: 83 BPM | DIASTOLIC BLOOD PRESSURE: 68 MMHG

## 2022-04-22 DIAGNOSIS — R73.9 HYPERGLYCEMIA: Primary | ICD-10-CM

## 2022-04-22 DIAGNOSIS — F41.9 ANXIETY: ICD-10-CM

## 2022-04-22 DIAGNOSIS — I10 ESSENTIAL HYPERTENSION: ICD-10-CM

## 2022-04-22 DIAGNOSIS — Z00.00 ROUTINE GENERAL MEDICAL EXAMINATION AT A HEALTH CARE FACILITY: Primary | ICD-10-CM

## 2022-04-22 DIAGNOSIS — K76.0 FATTY LIVER: ICD-10-CM

## 2022-04-22 DIAGNOSIS — R73.9 HYPERGLYCEMIA: ICD-10-CM

## 2022-04-22 DIAGNOSIS — G47.33 OSA (OBSTRUCTIVE SLEEP APNEA): ICD-10-CM

## 2022-04-22 DIAGNOSIS — R74.01 ELEVATED LIVER TRANSAMINASE LEVEL: ICD-10-CM

## 2022-04-22 DIAGNOSIS — E66.3 OVER WEIGHT: ICD-10-CM

## 2022-04-22 LAB
ALBUMIN SERPL-MCNC: 4.5 G/DL (ref 3.5–5.2)
ALP BLD-CCNC: 60 U/L (ref 35–104)
ALT SERPL-CCNC: 63 U/L (ref 0–32)
ANION GAP SERPL CALCULATED.3IONS-SCNC: 20 MMOL/L (ref 7–16)
AST SERPL-CCNC: 67 U/L (ref 0–31)
BASOPHILS ABSOLUTE: 0.06 E9/L (ref 0–0.2)
BASOPHILS RELATIVE PERCENT: 0.9 % (ref 0–2)
BILIRUB SERPL-MCNC: 0.5 MG/DL (ref 0–1.2)
BUN BLDV-MCNC: 14 MG/DL (ref 6–20)
CALCIUM SERPL-MCNC: 9.9 MG/DL (ref 8.6–10.2)
CHLORIDE BLD-SCNC: 102 MMOL/L (ref 98–107)
CO2: 19 MMOL/L (ref 22–29)
CREAT SERPL-MCNC: 0.8 MG/DL (ref 0.5–1)
EOSINOPHILS ABSOLUTE: 0.24 E9/L (ref 0.05–0.5)
EOSINOPHILS RELATIVE PERCENT: 3.5 % (ref 0–6)
GFR AFRICAN AMERICAN: >60
GFR NON-AFRICAN AMERICAN: >60 ML/MIN/1.73
GLUCOSE BLD-MCNC: 142 MG/DL (ref 74–99)
HCT VFR BLD CALC: 41.1 % (ref 34–48)
HEMOGLOBIN: 13.5 G/DL (ref 11.5–15.5)
IMMATURE GRANULOCYTES #: 0.02 E9/L
IMMATURE GRANULOCYTES %: 0.3 % (ref 0–5)
LYMPHOCYTES ABSOLUTE: 1.64 E9/L (ref 1.5–4)
LYMPHOCYTES RELATIVE PERCENT: 24.2 % (ref 20–42)
MAGNESIUM: 2 MG/DL (ref 1.6–2.6)
MCH RBC QN AUTO: 30.3 PG (ref 26–35)
MCHC RBC AUTO-ENTMCNC: 32.8 % (ref 32–34.5)
MCV RBC AUTO: 92.2 FL (ref 80–99.9)
MONOCYTES ABSOLUTE: 0.42 E9/L (ref 0.1–0.95)
MONOCYTES RELATIVE PERCENT: 6.2 % (ref 2–12)
NEUTROPHILS ABSOLUTE: 4.39 E9/L (ref 1.8–7.3)
NEUTROPHILS RELATIVE PERCENT: 64.9 % (ref 43–80)
PDW BLD-RTO: 13 FL (ref 11.5–15)
PLATELET # BLD: 265 E9/L (ref 130–450)
PMV BLD AUTO: 12.1 FL (ref 7–12)
POTASSIUM SERPL-SCNC: 4.3 MMOL/L (ref 3.5–5)
RBC # BLD: 4.46 E12/L (ref 3.5–5.5)
SODIUM BLD-SCNC: 141 MMOL/L (ref 132–146)
TOTAL PROTEIN: 8.2 G/DL (ref 6.4–8.3)
WBC # BLD: 6.8 E9/L (ref 4.5–11.5)

## 2022-04-22 PROCEDURE — 99396 PREV VISIT EST AGE 40-64: CPT | Performed by: INTERNAL MEDICINE

## 2022-04-22 ASSESSMENT — PATIENT HEALTH QUESTIONNAIRE - PHQ9
SUM OF ALL RESPONSES TO PHQ QUESTIONS 1-9: 0
SUM OF ALL RESPONSES TO PHQ9 QUESTIONS 1 & 2: 0
SUM OF ALL RESPONSES TO PHQ QUESTIONS 1-9: 0
SUM OF ALL RESPONSES TO PHQ QUESTIONS 1-9: 0
2. FEELING DOWN, DEPRESSED OR HOPELESS: 0
1. LITTLE INTEREST OR PLEASURE IN DOING THINGS: 0
SUM OF ALL RESPONSES TO PHQ QUESTIONS 1-9: 0

## 2022-04-24 NOTE — PROGRESS NOTES
[address] [place]     22  Christen Neighbor : 1970 Sex: female  Age: 46 y.o. Chief Complaint   Patient presents with    Annual Exam    Hypertension       HPI    Patient presents today for complete annual preventative visit. I reviewed Dr. Renetta Denis note from gastroenterology related to patient's fatty liver. He is recommending every 2 year elastography ultrasound. As we talked about numerous times he needs to lose weight and that is the treatment of choice. Voicing no specific complaints today. Weight is down about 2 pounds from last visit. Blood pressure has been in very good range and controlled on her antihypertensive medication. Pression/anxiety has been stable and controlled on her sertraline. We again discussed her elevated blood sugars and prediabetic state. Again the need to lose weight will help this. States she is up-to-date with GYN. Again did recently see gastroenterology. Review of Systems   Constitutional: Negative for activity change, appetite change, chills, diaphoresis, fatigue, fever and unexpected weight change.        Overweight   HENT: Negative for congestion, ear pain, hearing loss, postnasal drip, rhinorrhea and sinus pain.    Eyes: Negative.    Respiratory: Negative for cough, shortness of breath and wheezing.    Cardiovascular: Negative for chest pain, palpitations and leg swelling. Gastrointestinal: Negative for abdominal pain, blood in stool, constipation, diarrhea, nausea and vomiting. Endocrine: Negative.    Genitourinary: Negative for difficulty urinating, dysuria, frequency, hematuria and urgency. Musculoskeletal: Negative for arthralgias, back pain, gait problem and myalgias. Skin: Negative.    Allergic/Immunologic: Negative for environmental allergies and immunocompromised state. Neurological: Negative for dizziness, weakness, light-headedness, and headaches.  She has positive for hand paresthesia-unchanged  Hematological: Negative.    Psychiatric/Behavioral: Doing well on the sertraline for her anxiety               REST OF PERTINENT ROS GONE OVER AND WAS NEGATIVE. PMH  Health Maintenance:  Pelvic/Pap Exam - GYN  Breast Exam - GYN-dr ruiz  Mammogram - GYN  EKG - 1/14, 5/16  Ultrasound liver/elastography-1/22-due 22 and every 2 years  Medical Problems:  Hypertension  Fatty Liver - ultrasound 5/14  elevated LFT's - pos hep B atb-has had vaccine  upper lip hemangioma, preeclampsia, Sleep Apnea currently on CPAP and compliant with this  Anxiety-currently on medication  FH:  no siblings. Father:  Hypertension, Diverticulosis. Mother:  osteopenia. Maternal Grandmother:  Breast Cancer. SH:  Marital: . , physical therapist.  Personal Habits: Cigarette Use: Does Not Smoke. Alcohol: Occasionally consumes alcohol                Current Outpatient Medications:     sertraline (ZOLOFT) 50 MG tablet, 1/2 pill po daily x 1 week then 1 pill daily, Disp: 90 tablet, Rfl: 1    lisinopril-hydroCHLOROthiazide (PRINZIDE;ZESTORETIC) 20-25 MG per tablet, Take 1 tablet by mouth daily, Disp: 90 tablet, Rfl: 1    lisinopril (PRINIVIL;ZESTRIL) 10 MG tablet, Take 1 tablet by mouth daily, Disp: 90 tablet, Rfl: 1    amLODIPine (NORVASC) 5 MG tablet, Take 1 tablet by mouth daily, Disp: 90 tablet, Rfl: 1    ketorolac (ACULAR) 0.5 % ophthalmic solution, Place 1 drop into both eyes 3 times daily as needed (Allergy symptoms), Disp: 1 Bottle, Rfl: 3    Drospirenone (SLYND) 4 MG TABS, Take 1 tablet by mouth daily, Disp: , Rfl:     fexofenadine (ALLEGRA) 180 MG tablet, Take 1 tablet by mouth daily, Disp: 90 tablet, Rfl: 2    diphenhydrAMINE HCl (BENADRYL PO), Take 25 mg by mouth nightly 3 tablets every night, Disp: , Rfl:     PSYLLIUM PO, Take by mouth, Disp: , Rfl:     ibuprofen (ADVIL;MOTRIN) 200 MG tablet, Take 200 mg by mouth every 6 hours as needed for Pain, Disp: , Rfl:   No Known Allergies    Past Medical History:   Diagnosis Date    Class 2 obesity due to excess calories without serious comorbidity with body mass index (BMI) of 37.0 to 37.9 in adult 5/31/2019    Elevated LFTs     pos. Hep B atb- has had vaccine    Essential hypertension 5/31/2019    Fatty liver 05/2014    ultrasound    Hemangioma of lip     uppper    Hypertension     Liver function test abnormality 5/31/2019    KATARZYNA (obstructive sleep apnea) 5/31/2019    Preeclampsia     Sleep apnea      No past surgical history on file. Family History   Problem Relation Age of Onset    Hypertension Mother     Atrial Fibrillation Father     Hypertension Father      Social History     Socioeconomic History    Marital status:      Spouse name: Not on file    Number of children: Not on file    Years of education: Not on file    Highest education level: Not on file   Occupational History    Not on file   Tobacco Use    Smoking status: Never Smoker    Smokeless tobacco: Never Used   Substance and Sexual Activity    Alcohol use: Never    Drug use: Never    Sexual activity: Not on file   Other Topics Concern    Not on file   Social History Narrative    Not on file     Social Determinants of Health     Financial Resource Strain: Low Risk     Difficulty of Paying Living Expenses: Not hard at all   Food Insecurity: No Food Insecurity    Worried About Running Out of Food in the Last Year: Never true    [address] in the Last Year: Never true   Transportation Needs:     Lack of Transportation (Medical): Not on file    Lack of Transportation (Non-Medical):  Not on file   Physical Activity:     Days of Exercise per Week: Not on file    Minutes of Exercise per Session: Not on file   Stress:     Feeling of Stress : Not on file   Social Connections:     Frequency of Communication with Friends and Family: Not on file    Frequency of Social Gatherings with Friends and Family: Not on file    Attends Anabaptist Services: Not on file    Active Member of Clubs or Organizations: Not on file    Attends Club or Organization Meetings: Not on file    Marital Status: Not on file   Intimate Partner Violence:     Fear of Current or Ex-Partner: Not on file    Emotionally Abused: Not on file    Physically Abused: Not on file    Sexually Abused: Not on file   Housing Stability:     Unable to Pay for Housing in the Last Year: Not on file    Number of Juliana in the Last Year: Not on file    Unstable Housing in the Last Year: Not on file       Vitals:    04/22/22 0739   BP: 114/68   Pulse: 83   Temp: 98.4 °F (36.9 °C)   TempSrc: Temporal   SpO2: 97%   Weight: 232 lb 3.2 oz (105.3 kg)   Height: 5' 6\" (1.676 m)       Physical Exam    Constitutional: She is oriented to person, place, and time. She appears well-developed and well-nourished. No distress. HENT:   Head: Normocephalic and atraumatic. Right Ear: Hearing, tympanic membrane and ear canal normal.   Left Ear: Hearing, tympanic membrane and ear canal normal.   Mouth/Throat: Oropharynx is clear and moist.   Eyes: Pupils are equal, round, and reactive to light. EOM are normal.   Neck: Normal range of motion. Neck supple. No thyromegaly present. Cardiovascular: Normal rate, regular rhythm, normal heart sounds and intact distal pulses. Exam reveals no gallop and no friction rub. No murmur heard. Pulmonary/Chest: Effort normal and breath sounds normal. No respiratory distress. She has no wheezes. She has no rales. Abdominal: Soft. Bowel sounds are normal. She exhibits no distension and no mass. There is no tenderness. Musculoskeletal: Normal range of motion. Lymphadenopathy:     She has no cervical adenopathy. She has no axillary adenopathy. Right: No inguinal adenopathy present. Left: No inguinal adenopathy present. Neurological: She is alert and oriented to person, place, and time. She displays normal reflexes. No sensory deficit. She exhibits normal muscle tone.  Coordination normal.   Skin: Skin is warm and dry. No rash noted. No erythema. Psychiatric: She has a normal mood and affect. Her behavior is normal. Judgment and thought content normal.   Nursing note and vitals reviewed.           Assessment and Plan:  Melodie Zacarias was seen today for annual exam and hypertension. Diagnoses and all orders for this visit:    Routine general medical examination at a health care facility    Fatty liver  Recent liver ultrasound/elastography and gastroenterology consultation    Elevated liver transaminase level  Ongoing and related to fatty liver    Hyperglycemia  Prediabetic range-needs weight loss    Essential hypertension  -     Comprehensive Metabolic Panel; Future  -     CBC with Auto Differential; Future  -     Magnesium; Future  Stable and controlled on current antihypertensive medication    KATARZYNA (obstructive sleep apnea)  Stable and controlled on her CPAP    Over weight  Needs weight loss    Anxiety  Stable and controlled on her SSRI    Plan: I will see her back in 4 months and as needed. I did discuss colonoscopy and she wishes to wait at this time. We will rediscuss again at next visit. Blood work to monitor disease progression and medication use. Continue weight loss attempts. Monitor blood pressure closely. Notify us with problems in the interim. Return in about 4 months (around 8/22/2022). Seen By:  Case Carreon MD      *Document was created using voice recognition software. Note was reviewed however may contain grammatical errors.

## 2022-04-25 ENCOUNTER — TELEPHONE (OUTPATIENT)
Dept: FAMILY MEDICINE CLINIC | Age: 52
End: 2022-04-25

## 2022-04-25 DIAGNOSIS — R73.9 HYPERGLYCEMIA: ICD-10-CM

## 2022-04-25 LAB — HBA1C MFR BLD: 6.4 % (ref 4–5.6)

## 2022-04-26 NOTE — TELEPHONE ENCOUNTER
Hemoglobin A1c was 6.4 with 6.5 and above consider diabetic range.   Have her get those couple more blood sugars high asked for in  previous encounter

## 2022-04-27 NOTE — TELEPHONE ENCOUNTER
Yusra message sent to patient letting her know what her A1C was and to follow up with getting her fasting fingerstick glucose.

## 2022-04-28 ENCOUNTER — NURSE ONLY (OUTPATIENT)
Dept: FAMILY MEDICINE CLINIC | Age: 52
End: 2022-04-28
Payer: COMMERCIAL

## 2022-04-28 ENCOUNTER — TELEPHONE (OUTPATIENT)
Dept: FAMILY MEDICINE CLINIC | Age: 52
End: 2022-04-28

## 2022-04-28 DIAGNOSIS — R73.9 HYPERGLYCEMIA: Primary | ICD-10-CM

## 2022-04-28 LAB
CHP ED QC CHECK: NORMAL
GLUCOSE BLD-MCNC: 130 MG/DL

## 2022-04-28 PROCEDURE — 99999 PR OFFICE/OUTPT VISIT,PROCEDURE ONLY: CPT | Performed by: INTERNAL MEDICINE

## 2022-04-28 PROCEDURE — 82962 GLUCOSE BLOOD TEST: CPT | Performed by: INTERNAL MEDICINE

## 2022-04-28 NOTE — PROGRESS NOTES
Logan Fan in for a fasting blood glucose check today. She was indeed fasting. Glucose was 130. I advised pt we will call her after Dr. Tj Olsen reviews.

## 2022-04-28 NOTE — TELEPHONE ENCOUNTER
I ordered 1 more blood sugar which she needs to get however I do believe she most likely has a type II diabetic. We will talk  after the next blood sugar.

## 2022-04-28 NOTE — TELEPHONE ENCOUNTER
Nazia Perez in for a fasting blood glucose check today. She was indeed fasting. Glucose was 130. I advised pt we will call her after Dr. Fabricio Mazariegos reviews.

## 2022-05-17 ENCOUNTER — TELEPHONE (OUTPATIENT)
Dept: FAMILY MEDICINE CLINIC | Age: 52
End: 2022-05-17

## 2022-05-17 ENCOUNTER — NURSE ONLY (OUTPATIENT)
Dept: FAMILY MEDICINE CLINIC | Age: 52
End: 2022-05-17
Payer: COMMERCIAL

## 2022-05-17 DIAGNOSIS — R73.9 HYPERGLYCEMIA: Primary | ICD-10-CM

## 2022-05-17 LAB
CHP ED QC CHECK: NORMAL
GLUCOSE BLD-MCNC: 145 MG/DL

## 2022-05-17 PROCEDURE — 82962 GLUCOSE BLOOD TEST: CPT | Performed by: INTERNAL MEDICINE

## 2022-05-17 NOTE — TELEPHONE ENCOUNTER
This blood sugar confirms type 2 diabetes. Please set her up in the next month to review all of this.

## 2022-05-17 NOTE — PROGRESS NOTES
Patient came to the office today for a fasting blood glucose. Documented result and forwarded to PCP.

## 2022-05-18 NOTE — TELEPHONE ENCOUNTER
Evelyn called back and was advised. She has an appointment on August 26th. She would like to keep this date and time and discuss at that time. Is that ok with you?

## 2022-08-26 ENCOUNTER — OFFICE VISIT (OUTPATIENT)
Dept: FAMILY MEDICINE CLINIC | Age: 52
End: 2022-08-26
Payer: COMMERCIAL

## 2022-08-26 ENCOUNTER — TELEPHONE (OUTPATIENT)
Dept: FAMILY MEDICINE CLINIC | Age: 52
End: 2022-08-26

## 2022-08-26 VITALS
HEIGHT: 66 IN | BODY MASS INDEX: 37.55 KG/M2 | HEART RATE: 79 BPM | WEIGHT: 233.64 LBS | DIASTOLIC BLOOD PRESSURE: 76 MMHG | SYSTOLIC BLOOD PRESSURE: 136 MMHG | TEMPERATURE: 99 F | OXYGEN SATURATION: 98 %

## 2022-08-26 DIAGNOSIS — I10 ESSENTIAL HYPERTENSION: ICD-10-CM

## 2022-08-26 DIAGNOSIS — R74.01 ELEVATED LIVER TRANSAMINASE LEVEL: ICD-10-CM

## 2022-08-26 DIAGNOSIS — R73.9 HYPERGLYCEMIA: ICD-10-CM

## 2022-08-26 DIAGNOSIS — G47.33 OSA (OBSTRUCTIVE SLEEP APNEA): ICD-10-CM

## 2022-08-26 DIAGNOSIS — K76.0 FATTY LIVER: ICD-10-CM

## 2022-08-26 DIAGNOSIS — E66.3 OVER WEIGHT: ICD-10-CM

## 2022-08-26 LAB
ALBUMIN SERPL-MCNC: 4.8 G/DL (ref 3.5–5.2)
ALP BLD-CCNC: 56 U/L (ref 35–104)
ALT SERPL-CCNC: 59 U/L (ref 0–32)
ANION GAP SERPL CALCULATED.3IONS-SCNC: 14 MMOL/L (ref 7–16)
AST SERPL-CCNC: 65 U/L (ref 0–31)
BASOPHILS ABSOLUTE: 0.08 E9/L (ref 0–0.2)
BASOPHILS RELATIVE PERCENT: 1.1 % (ref 0–2)
BILIRUB SERPL-MCNC: 0.4 MG/DL (ref 0–1.2)
BUN BLDV-MCNC: 13 MG/DL (ref 6–20)
CALCIUM SERPL-MCNC: 9.8 MG/DL (ref 8.6–10.2)
CHLORIDE BLD-SCNC: 99 MMOL/L (ref 98–107)
CHOLESTEROL, TOTAL: 198 MG/DL (ref 0–199)
CO2: 23 MMOL/L (ref 22–29)
CREAT SERPL-MCNC: 1 MG/DL (ref 0.5–1)
EOSINOPHILS ABSOLUTE: 0.35 E9/L (ref 0.05–0.5)
EOSINOPHILS RELATIVE PERCENT: 4.9 % (ref 0–6)
GFR AFRICAN AMERICAN: >60
GFR NON-AFRICAN AMERICAN: 58 ML/MIN/1.73
GLUCOSE BLD-MCNC: 145 MG/DL (ref 74–99)
HBA1C MFR BLD: 6.6 % (ref 4–5.6)
HCT VFR BLD CALC: 40.6 % (ref 34–48)
HDLC SERPL-MCNC: 39 MG/DL
HEMOGLOBIN: 13.5 G/DL (ref 11.5–15.5)
IMMATURE GRANULOCYTES #: 0.02 E9/L
IMMATURE GRANULOCYTES %: 0.3 % (ref 0–5)
LDL CHOLESTEROL CALCULATED: 139 MG/DL (ref 0–99)
LYMPHOCYTES ABSOLUTE: 1.99 E9/L (ref 1.5–4)
LYMPHOCYTES RELATIVE PERCENT: 27.6 % (ref 20–42)
MCH RBC QN AUTO: 29.9 PG (ref 26–35)
MCHC RBC AUTO-ENTMCNC: 33.3 % (ref 32–34.5)
MCV RBC AUTO: 89.8 FL (ref 80–99.9)
MONOCYTES ABSOLUTE: 0.44 E9/L (ref 0.1–0.95)
MONOCYTES RELATIVE PERCENT: 6.1 % (ref 2–12)
NEUTROPHILS ABSOLUTE: 4.32 E9/L (ref 1.8–7.3)
NEUTROPHILS RELATIVE PERCENT: 60 % (ref 43–80)
PDW BLD-RTO: 13.2 FL (ref 11.5–15)
PLATELET # BLD: 280 E9/L (ref 130–450)
PMV BLD AUTO: 12.2 FL (ref 7–12)
POTASSIUM SERPL-SCNC: 4.2 MMOL/L (ref 3.5–5)
RBC # BLD: 4.52 E12/L (ref 3.5–5.5)
SODIUM BLD-SCNC: 136 MMOL/L (ref 132–146)
TOTAL PROTEIN: 8 G/DL (ref 6.4–8.3)
TRIGL SERPL-MCNC: 101 MG/DL (ref 0–149)
VLDLC SERPL CALC-MCNC: 20 MG/DL
WBC # BLD: 7.2 E9/L (ref 4.5–11.5)

## 2022-08-26 PROCEDURE — 99214 OFFICE O/P EST MOD 30 MIN: CPT | Performed by: INTERNAL MEDICINE

## 2022-08-26 RX ORDER — LISINOPRIL 10 MG/1
10 TABLET ORAL DAILY
Qty: 90 TABLET | Refills: 1 | Status: SHIPPED | OUTPATIENT
Start: 2022-08-26

## 2022-08-26 RX ORDER — AMLODIPINE BESYLATE 5 MG/1
5 TABLET ORAL DAILY
Qty: 90 TABLET | Refills: 1 | Status: CANCELLED | OUTPATIENT
Start: 2022-08-26

## 2022-08-26 RX ORDER — LISINOPRIL AND HYDROCHLOROTHIAZIDE 25; 20 MG/1; MG/1
1 TABLET ORAL DAILY
Qty: 90 TABLET | Refills: 1 | Status: SHIPPED | OUTPATIENT
Start: 2022-08-26

## 2022-08-26 ASSESSMENT — PATIENT HEALTH QUESTIONNAIRE - PHQ9
2. FEELING DOWN, DEPRESSED OR HOPELESS: 0
SUM OF ALL RESPONSES TO PHQ9 QUESTIONS 1 & 2: 0
SUM OF ALL RESPONSES TO PHQ QUESTIONS 1-9: 0
1. LITTLE INTEREST OR PLEASURE IN DOING THINGS: 0
SUM OF ALL RESPONSES TO PHQ QUESTIONS 1-9: 0

## 2022-08-26 NOTE — TELEPHONE ENCOUNTER
Patient's numbers indicate that she is a type II diabetic. I am afraid during the visit today I did not discuss things with her related to that but more as though she were prediabetic. Both fasting blood sugar and hemoglobin A1c were elevated today. Please let her know and please ask her to get set up to see me sometime in the next month for a brief visit at which point we will set her up with diabetic supplies and go over things in more detail.

## 2022-08-31 ENCOUNTER — TELEPHONE (OUTPATIENT)
Dept: BARIATRICS/WEIGHT MGMT | Age: 52
End: 2022-08-31

## 2022-10-28 ENCOUNTER — OFFICE VISIT (OUTPATIENT)
Dept: BARIATRICS/WEIGHT MGMT | Age: 52
End: 2022-10-28
Payer: COMMERCIAL

## 2022-10-28 VITALS
TEMPERATURE: 97.3 F | HEART RATE: 85 BPM | SYSTOLIC BLOOD PRESSURE: 150 MMHG | WEIGHT: 235 LBS | BODY MASS INDEX: 37.77 KG/M2 | DIASTOLIC BLOOD PRESSURE: 74 MMHG | HEIGHT: 66 IN

## 2022-10-28 DIAGNOSIS — I10 ESSENTIAL HYPERTENSION: Primary | ICD-10-CM

## 2022-10-28 DIAGNOSIS — E66.09 CLASS 2 OBESITY DUE TO EXCESS CALORIES WITHOUT SERIOUS COMORBIDITY WITH BODY MASS INDEX (BMI) OF 37.0 TO 37.9 IN ADULT: ICD-10-CM

## 2022-10-28 PROCEDURE — 99205 OFFICE O/P NEW HI 60 MIN: CPT | Performed by: INTERNAL MEDICINE

## 2022-10-28 PROCEDURE — 3078F DIAST BP <80 MM HG: CPT | Performed by: INTERNAL MEDICINE

## 2022-10-28 PROCEDURE — 3074F SYST BP LT 130 MM HG: CPT | Performed by: INTERNAL MEDICINE

## 2022-10-28 NOTE — PATIENT INSTRUCTIONS
Rules:  Count every calorie every day (you can use free sophie such as 'baritastic' or 'nutritionix track')  Limit sweets to one day per month  Limit chips/crackers/pretzels/nuts/popcorn to 150 leonardo/day  Eliminate all sugar sweetened beverages (including fruit juice)  Limit restaurants (including fast food and food from a convenience store) to one time every two weeks while in town    Requirements:  Make sure protein intake is at least 70 grams per day (do not count protein every day; instead spot check your intake every 2-3 weeks and make sure what you think you are getting is close to accurate; consider using a protein shake if needed; these are in the pharmacy section of the stores, not the grocery section; Premier, Pure Protein and Fairlife are relatively inexpensive and taste good to most patients; other options are Nectar, Boost Max, Ensure Max, BeneProtein and GNC lean (which is lactose-free); Nectar fruit, Premier Protein Clear, IsoPure Protein Drink, and Protein 2 O are water-based options; Quest (or Cosco, which is cheaper and is ordered on [employer]) and the PersistIQ protein bars can also be used, but have less protein in them ) (<200 Leonardo, >25 g protein) - (chicken, fish, turkey, egg white)  (Disclaimer: Dietary supplements rarely have their listed ingredients and the amount of each verified by a third party other. Sometimes they give verification for their claims to be GMO and gluten free and to be organic. However, even such verifications as these may still be untrustworthy.)  Make sure that fiber intake is at least 25 grams per day. Do this by either eating 12 tablespoons of the original, plain Fiber One cereal every day or 4 tablespoons of wheat dextrin powder (Benefiber or a generic brand) every day. Work up to this amount slowly by starting with only one-eighth to one-fourth of the target amount and then adding another one-eighth to one-fourth every one or two weeks until reaching the target.   Take one multivitamin every day    Targets:  Limit calorie intake to 1400 calories/day  Walk 30 minutes daily  Avoid eating 2 hours within bedtime. Tips:  Do not eat outside of the dining room or the kitchen  Do not eat while watching TV, videos, working on the computer or using a smart phone  Do not eat food out of a multi-serving bag or container. Follow up in 4 weeks.

## 2022-10-28 NOTE — PROGRESS NOTES
CC -   Referred for:  newly dxed DM2, Obesity. HTN/KATARZYNA. BACKGROUND -   First visit: 10/28/22    Obesity (all weight in lbs)  Began  shobha in the last 10 yrs - (+20 lbs in the last 10 yrs)  Initial Ht 66.25, Wt 235.0,  BMI 37.64  HS Grad wt 140-145   Lowest   wt 140   Highest  wt 235  Pattern of wt gain: gradual  Wt change past yr: about the same (228-235)  Most wt lost: 30 lbs ([employer] 15 yrs ago) now 3-6 lbs  Other diets attempted: difficulty trying to do on her own - watching diet and exercising    Desire to lose weight: 8-10/10    Initial Diet:    Number of meals per day - 2-3 (s-b)    Number of snacks per day - 1    Meal volume - 9\" plate,  usually seconds    Fast food/convenience store - 0-1x/week    Restaurants (not fast food) - 2x/week   Sweets - 0-1d/week   Chips - 2-3d/week   Crackers/pretzels - 3d/week   Nuts - 0d/week   Peanut Butter - 0d/week   Popcorn - 0-1d/week (in place of chips smt)   Dried fruit - 0d/week   Whole fruit - 3d/week   Breakfast cereal - 0d/week   Granola/Protein/Energy bar - 0d/week   Sugar sweetened beverages - no pop/soda, no fruit juice (0 calorie flavored water or juice with psyllium husk), coffee 1 large mug with unsweetened vanilla almond milk, no tea, no energy drink   Protein - No supplements   Fiber - Psyllium husk bid 1 tbsp x 2 = 9g/d    Wt effect of HR foods = 1050 Leonardo/wk = 150 Leonardo/d= 7% DEN = 16 lb/year. Initial Exercise:    Gym membership - no    Walking - 2-4 x/wk - 35-40 min. Running - no    Resistance - no    Aerobic class - no. 20-30 min of HIIT when not able to walk.    ______________________    [facility] -  Past Medical History:   Diagnosis Date    Class 2 obesity due to excess calories without serious comorbidity with body mass index (BMI) of 37.0 to 37.9 in adult 5/31/2019    Elevated LFTs     pos.  Hep B atb- has had vaccine    Essential hypertension 5/31/2019    Fatty liver 05/2014    ultrasound    Hemangioma of lip     uppper    Hypertension     Liver function test abnormality 2019    KATARZYNA (obstructive sleep apnea) 2019    Preeclampsia     Sleep apnea      Past Surgical History:   Procedure Laterality Date     SECTION  1996    CHOLECYSTECTOMY, LAPAROSCOPIC  1996   Had ?endometriosis after   Small lesion excision over upper lip. Prior to Admission medications    Medication Sig Start Date End Date Taking? Authorizing Provider   lisinopril (PRINIVIL;ZESTRIL) 10 MG tablet Take 1 tablet by mouth daily 22   Dianna Apodaca MD   lisinopril-hydroCHLOROthiazide [facility]) 20-25 MG per tablet Take 1 tablet by mouth daily 22   Dianna Apodaca MD   sertraline (ZOLOFT) 50 MG tablet 1/2 pill po daily x 1 week then 1 pill daily 22   Dianna Apodaca MD   Drospirenone (SLYND) 4 MG TABS Take 1 tablet by mouth daily    Historical Provider, MD   diphenhydrAMINE HCl (BENADRYL PO) Take 25 mg by mouth nightly 3 tablets every night    Historical Provider, MD   PSYLLIUM PO Take by mouth    Historical Provider, MD   ibuprofen (ADVIL;MOTRIN) 200 MG tablet Take 200 mg by mouth every 6 hours as needed for Pain    Historical Provider, MD     Allergies: No Known Allergies    Social history: smoking: never ; Alcohol: occasional 2x/wk - light beer or wine , work: rehab manager (desk job). ROS -  Card - with anxiety - some tightness and goes away after breathing deep  GI - no N/V/D/C    PE -  Gen : BP (!) 150/74 (Site: Left Upper Arm, Position: Sitting, Cuff Size: Large Adult)   Pulse 85   Temp 97.3 °F (36.3 °C) (Temporal)   Ht 5' 6.25\" (1.683 m)   Wt 235 lb (106.6 kg)   BMI 37.64 kg/m²   Repeat BP: 160/80, HR86. WN, WD, NAD  Lung: Nml resp effort, CTA B/L  Heart:  RRR w/ 1/6 systolic murmur in left sternal border as well as aortic area, no LE pitting edema b/l  Psych: Normal mood   Full affect  Neuro:  Moves all ext well  ______________________    HISTORY & ASSESSMENT/PLAN -     Problem 1 - Hypertension   HPI   - BP is usually controlled on current medication, was high today even on repeat, patient asymptomatic. Assessment  - Uncontrolled  Plan   - Continue medication, monitor - may need another agent (or resume Amlodipine) if BP stays high. Weight reduction can help. Weight reduction per plan below    Problem 2  - Obesity   HPI   - See above Background for description    Weight  Date    235.0  10/28/22    Patient's estimated daily energy need (DEN) = 2068 Leonardo/d = 11653 Leonardo/wk    Assessment  - Uncontrolled    Plan   - Talked about various options. Would like calorie counting with low calorie diet as detailed below. Would like to start without an appetite suppressant first. Planned as follows:  Patient Instructions   Rules:  Count every calorie every day (you can use free sophie such as 'mPortal' or 'nutritionix track')  Limit sweets to one day per month  Limit chips/crackers/pretzels/nuts/popcorn to 150 leonardo/day  Eliminate all sugar sweetened beverages (including fruit juice)  Limit restaurants (including fast food and food from a convenience store) to one time every two weeks while in town    Requirements:  Make sure protein intake is at least 70 grams per day (do not count protein every day; instead spot check your intake every 2-3 weeks and make sure what you think you are getting is close to accurate; consider using a protein shake if needed; these are in the pharmacy section of the stores, not the grocery section; Premier, Pure Protein and Fairlife are relatively inexpensive and taste good to most patients; other options are Nectar, Boost Max, Ensure Max, BeneProtein and GNC lean (which is lactose-free);    Nectar fruit, Premier Protein Clear, IsoPure Protein Drink, and Protein 2 O are water-based options; Quest (or Cosco, which is cheaper and is ordered on [employer]) and the Oh Ye[employer]re 1 protein bars can also be used, but have less protein in them ) (<200 Leonardo, >25 g protein) - (chicken, fish, turkey, egg white)  (Disclaimer: Dietary supplements rarely have their listed ingredients and the amount of each verified by a third party other. Sometimes they give verification for their claims to be GMO and gluten free and to be organic. However, even such verifications as these may still be untrustworthy.)  Make sure that fiber intake is at least 25 grams per day. Do this by either eating 12 tablespoons of the original, plain Fiber One cereal every day or 4 tablespoons of wheat dextrin powder (Benefiber or a generic brand) every day. Work up to this amount slowly by starting with only one-eighth to one-fourth of the target amount and then adding another one-eighth to one-fourth every one or two weeks until reaching the target. Take one multivitamin every day    Targets:  Limit calorie intake to 1400 calories/day  Walk 30 minutes daily  Avoid eating 2 hours within bedtime. Tips:  Do not eat outside of the dining room or the kitchen  Do not eat while watching TV, videos, working on the computer or using a smart phone  Do not eat food out of a multi-serving bag or container. Follow up in 4 weeks. Total time spent on encounter: 60 min. Sara Wilson MD  Internal Medicine/Obesity Medicine  10/28/2022.

## 2022-12-09 ENCOUNTER — OFFICE VISIT (OUTPATIENT)
Dept: BARIATRICS/WEIGHT MGMT | Age: 52
End: 2022-12-09
Payer: COMMERCIAL

## 2022-12-09 VITALS
SYSTOLIC BLOOD PRESSURE: 136 MMHG | HEIGHT: 66 IN | HEART RATE: 72 BPM | DIASTOLIC BLOOD PRESSURE: 63 MMHG | TEMPERATURE: 97.4 F | WEIGHT: 226.4 LBS | BODY MASS INDEX: 36.38 KG/M2

## 2022-12-09 DIAGNOSIS — I10 ESSENTIAL HYPERTENSION: Primary | ICD-10-CM

## 2022-12-09 DIAGNOSIS — E66.09 CLASS 2 OBESITY DUE TO EXCESS CALORIES WITHOUT SERIOUS COMORBIDITY WITH BODY MASS INDEX (BMI) OF 36.0 TO 36.9 IN ADULT: ICD-10-CM

## 2022-12-09 PROCEDURE — 3078F DIAST BP <80 MM HG: CPT | Performed by: INTERNAL MEDICINE

## 2022-12-09 PROCEDURE — 99213 OFFICE O/P EST LOW 20 MIN: CPT | Performed by: INTERNAL MEDICINE

## 2022-12-09 PROCEDURE — 3074F SYST BP LT 130 MM HG: CPT | Performed by: INTERNAL MEDICINE

## 2022-12-09 NOTE — PATIENT INSTRUCTIONS
Rules:  Count every calorie every day  Limit sweets to one day per month  Limit chips/crackers/pretzels/nuts/popcorn to 150 adela/day  Eliminate all sugar sweetened beverages (including fruit juice)  Limit restaurants (including fast food and food from a convenience store) to one time every two weeks while in town    Requirements:  Make sure protein intake is at least 70 grams per day  Make sure that fiber intake is at least 25 grams per day  Take one multivitamin every day    Targets:  Limit calorie intake to 1400 calories/day  Walk 30 minutes daily  Avoid eating 2 hours within bedtime. Tips:  Do not eat outside of the dining room or the kitchen  Do not eat while watching TV, videos, working on the computer or using a smart phone  Do not eat food out of a multi-serving bag or container. Low calorie non-starchy vegetables:  Food (per 100 g) Calories Fibers T. Carbohydrates Protein Fat Sodium (mg) Potassium (mg)   Green Bell Peppers 10 1.7 4.6 0.9 0.2 3 175   Cucumbers 15 0.5 3.6 0.7 0.1 2 147   Celery 16 1.6 3 0.7 0.2 80 260   Tomatoes 18 1.2 3.9 0.9 0.2 5 237   Carrots 41 2.8 10 0.9 0.2 69 320   Cabbage 25 2.5 6 1.3 0.1 18 170   Broccoli 35 3.3 7.2 2.4 0.4 41 293   Cauliflower 23 2.3 4.1 1.8 0.5 15 142   Iceberg Lettuce 14 1.2 3 0.9 0.1 10 141   Kale 28 2 5.6 1.9 0.4 23 228   Brussel Sprouts 43 3.8 9 3.4 0.3 25 389   Spinach 23 2.4 3.8 3 0.3 70 466   Zucchini 15 1 2.7 1.1 0.4 3 264   Mushroom 28 2.2 5.3 2.2 0.5 2 356   Asparagus 22 2 4.1 2.4 0.2 14 224   Green Beans 35 3.2 7.9 1.9 0.3 1 146   Eggplant 35 2.5 8.7 0.8 0.2 1 123     Follow up in 6-8 weeks.

## 2022-12-09 NOTE — PROGRESS NOTES
CC -   Follow up of: Referred for:  newly dxed DM2, Obesity. HTN/KATARZYNA. BACKGROUND -   Last visit: 10/28/22  First visit: 10/28/22    Obesity (all weight in lbs)  Began  shobha in the last 10 yrs - (+20 lbs in the last 10 yrs)  Initial Ht 66.25, Wt 235.0,  BMI 37.64  HS Grad wt 140-145   Lowest   wt 140   Highest  wt 235  Pattern of wt gain: gradual  Wt change past yr: about the same (228-235)  Most wt lost: 30 lbs ([employer] 15 yrs ago) now 3-6 lbs  Other diets attempted: difficulty trying to do on her own - watching diet and exercising    Desire to lose weight: 8-10/10    Initial Diet:    Number of meals per day - 2-3 (s-b)    Number of snacks per day - 1    Meal volume - 9\" plate,  usually seconds    Fast food/convenience store - 0-1x/week    Restaurants (not fast food) - 2x/week   Sweets - 0-1d/week   Chips - 2-3d/week   Crackers/pretzels - 3d/week   Nuts - 0d/week   Peanut Butter - 0d/week   Popcorn - 0-1d/week (in place of chips smt)   Dried fruit - 0d/week   Whole fruit - 3d/week   Breakfast cereal - 0d/week   Granola/Protein/Energy bar - 0d/week   Sugar sweetened beverages - no pop/soda, no fruit juice (0 calorie flavored water or juice with psyllium husk), coffee 1 large mug with unsweetened vanilla almond milk, no tea, no energy drink   Protein - No supplements   Fiber - Psyllium husk bid 1 tbsp x 2 = 9g/d    Wt effect of HR foods = 1050 Leonardo/wk = 150 Leonardo/d= 7% DEN = 16 lb/year. Initial Exercise:    Gym membership - no    Walking - 2-4 x/wk - 35-40 min. Running - no    Resistance - no    Aerobic class - no. 20-30 min of HIIT when not able to walk.    ______________________    [facility] -  Past Medical History:   Diagnosis Date    Class 2 obesity due to excess calories without serious comorbidity with body mass index (BMI) of 37.0 to 37.9 in adult 5/31/2019    Elevated LFTs     pos.  Hep B atb- has had vaccine    Essential hypertension 5/31/2019    Fatty liver 05/2014    ultrasound    Hemangioma of lip     uppper Hypertension     Liver function test abnormality 2019    KAATRZYNA (obstructive sleep apnea) 2019    Preeclampsia     Sleep apnea      Past Surgical History:   Procedure Laterality Date     SECTION  1996    CHOLECYSTECTOMY, LAPAROSCOPIC  1996   Had ?endometriosis after   Small lesion excision over upper lip. Prior to Admission medications    Medication Sig Start Date End Date Taking? Authorizing Provider   lisinopril (PRINIVIL;ZESTRIL) 10 MG tablet Take 1 tablet by mouth daily 22   James Joseph MD   lisinopril-hydroCHLOROthiazide [facility]ELD [facility]) 20-25 MG per tablet Take 1 tablet by mouth daily 22   James Joseph MD   sertraline (ZOLOFT) 50 MG tablet 1/2 pill po daily x 1 week then 1 pill daily 22   James Joseph MD   Drospirenone (SLYND) 4 MG TABS Take 1 tablet by mouth daily    Historical Provider, MD   diphenhydrAMINE HCl (BENADRYL PO) Take 25 mg by mouth nightly 3 tablets every night    Historical Provider, MD   PSYLLIUM PO Take by mouth    Historical Provider, MD   ibuprofen (ADVIL;MOTRIN) 200 MG tablet Take 200 mg by mouth every 6 hours as needed for Pain    Historical Provider, MD     22: not taking MVI currently. Allergies: No Known Allergies    Social history: smoking: never ; Alcohol: occasional 2x/wk - light beer or wine , work: rehab manager (desk job). ROS -  Card - with anxiety - some tightness and goes away after breathing deep  GI - no N/V/D/C    PE -  Gen : /63 (Site: Left Upper Arm, Position: Sitting, Cuff Size: Large Adult)   Pulse 72   Temp 97.4 °F (36.3 °C) (Temporal)   Ht 5' 6.25\" (1.683 m)   Wt 226 lb 6.4 oz (102.7 kg)   BMI 36.27 kg/m²    WN, WD, NAD  Lung: Nml resp effort, CTA B/L  Heart:  RRR w/ 1/6 systolic murmur in left sternal border as well as aortic area, no LE pitting edema b/l  Psych: Normal mood   Full affect  Neuro:  Moves all ext well  ______________________    HISTORY & ASSESSMENT/PLAN -     Problem 1 - Hypertension   HPI   - BP is currently controlled, patient asymptomatic. Assessment  - Controlled  Plan   - Continue medication, monitor. Weight reduction can help de-escalate the dose of medication. Weight reduction per plan below    Problem 2  - Obesity   HPI   - See above Background for description    Weight  Date    235.0  10/28/22    226.4  22      Patient's estimated daily energy need (DEN) = 2068 Leonardo/d = 07665 Leonardo/wk    Update:  Calorie monitorind/wk, usual intake 3715-3378 Leonardo/d  Sweets: 1d/month (day after )  SSB: none, flavored water, mostly water  Restaurant food: 3x/month  Appetite suppressant: None  Home BP monitoring: NA  Protein: vanilla protein shake 21 gm - 150 leonardo/bottle  Fiber: psyllium husks bid  Water: 60-90 oz/day  Activities: consistently 2-4d/wk - HIIT and walking. Assessment  - Uncontrolled    Plan   - She is doing very well with current plan, and would like to continue. Does not feel the need for an appetite suppressant. Planned as follows  Patient Instructions   Rules:  Count every calorie every day  Limit sweets to one day per month  Limit chips/crackers/pretzels/nuts/popcorn to 150 leonardo/day  Eliminate all sugar sweetened beverages (including fruit juice)  Limit restaurants (including fast food and food from a convenience store) to one time every two weeks while in town    Requirements:  Make sure protein intake is at least 70 grams per day  Make sure that fiber intake is at least 25 grams per day  Take one multivitamin every day    Targets:  Limit calorie intake to 1400 calories/day  Walk 30 minutes daily  Avoid eating 2 hours within bedtime. Tips:  Do not eat outside of the dining room or the kitchen  Do not eat while watching TV, videos, working on the computer or using a smart phone  Do not eat food out of a multi-serving bag or container. List of low calorie non-starchy vegetables was provided. Follow up in 6-8 weeks.     Total time spent on encounter: 21 min. Mingo Melendez MD  Internal Medicine/Obesity Medicine  12/9/2022.

## 2022-12-19 DIAGNOSIS — I10 ESSENTIAL HYPERTENSION: ICD-10-CM

## 2022-12-20 RX ORDER — LISINOPRIL AND HYDROCHLOROTHIAZIDE 25; 20 MG/1; MG/1
1 TABLET ORAL DAILY
Qty: 45 TABLET | Refills: 0 | Status: SHIPPED | OUTPATIENT
Start: 2022-12-20

## 2023-01-26 SDOH — HEALTH STABILITY: PHYSICAL HEALTH: ON AVERAGE, HOW MANY DAYS PER WEEK DO YOU ENGAGE IN MODERATE TO STRENUOUS EXERCISE (LIKE A BRISK WALK)?: 3 DAYS

## 2023-01-26 SDOH — HEALTH STABILITY: PHYSICAL HEALTH: ON AVERAGE, HOW MANY MINUTES DO YOU ENGAGE IN EXERCISE AT THIS LEVEL?: 40 MIN

## 2023-01-27 ENCOUNTER — OFFICE VISIT (OUTPATIENT)
Dept: FAMILY MEDICINE CLINIC | Age: 53
End: 2023-01-27
Payer: COMMERCIAL

## 2023-01-27 VITALS
TEMPERATURE: 97.8 F | DIASTOLIC BLOOD PRESSURE: 86 MMHG | WEIGHT: 227 LBS | HEART RATE: 92 BPM | OXYGEN SATURATION: 97 % | SYSTOLIC BLOOD PRESSURE: 136 MMHG | BODY MASS INDEX: 36.36 KG/M2

## 2023-01-27 DIAGNOSIS — R73.03 PRE-DIABETES: ICD-10-CM

## 2023-01-27 DIAGNOSIS — F41.9 ANXIETY: ICD-10-CM

## 2023-01-27 DIAGNOSIS — R05.8 POST-VIRAL COUGH SYNDROME: Primary | ICD-10-CM

## 2023-01-27 DIAGNOSIS — I10 ESSENTIAL HYPERTENSION: ICD-10-CM

## 2023-01-27 LAB
ALBUMIN SERPL-MCNC: 4.5 G/DL (ref 3.5–5.2)
ALP BLD-CCNC: 60 U/L (ref 35–104)
ALT SERPL-CCNC: 51 U/L (ref 0–32)
ANION GAP SERPL CALCULATED.3IONS-SCNC: 15 MMOL/L (ref 7–16)
AST SERPL-CCNC: 59 U/L (ref 0–31)
BASOPHILS ABSOLUTE: 0.08 E9/L (ref 0–0.2)
BASOPHILS RELATIVE PERCENT: 1.1 % (ref 0–2)
BILIRUB SERPL-MCNC: 0.4 MG/DL (ref 0–1.2)
BUN BLDV-MCNC: 15 MG/DL (ref 6–20)
CALCIUM SERPL-MCNC: 9.8 MG/DL (ref 8.6–10.2)
CHLORIDE BLD-SCNC: 100 MMOL/L (ref 98–107)
CO2: 21 MMOL/L (ref 22–29)
CREAT SERPL-MCNC: 0.8 MG/DL (ref 0.5–1)
EOSINOPHILS ABSOLUTE: 0.25 E9/L (ref 0.05–0.5)
EOSINOPHILS RELATIVE PERCENT: 3.5 % (ref 0–6)
GFR SERPL CREATININE-BSD FRML MDRD: >60 ML/MIN/1.73
GLUCOSE BLD-MCNC: 90 MG/DL (ref 74–99)
HBA1C MFR BLD: 5.8 % (ref 4–5.6)
HCT VFR BLD CALC: 42.5 % (ref 34–48)
HEMOGLOBIN: 14.2 G/DL (ref 11.5–15.5)
IMMATURE GRANULOCYTES #: 0.03 E9/L
IMMATURE GRANULOCYTES %: 0.4 % (ref 0–5)
LYMPHOCYTES ABSOLUTE: 2.26 E9/L (ref 1.5–4)
LYMPHOCYTES RELATIVE PERCENT: 31.8 % (ref 20–42)
MCH RBC QN AUTO: 29.9 PG (ref 26–35)
MCHC RBC AUTO-ENTMCNC: 33.4 % (ref 32–34.5)
MCV RBC AUTO: 89.5 FL (ref 80–99.9)
MONOCYTES ABSOLUTE: 0.53 E9/L (ref 0.1–0.95)
MONOCYTES RELATIVE PERCENT: 7.5 % (ref 2–12)
NEUTROPHILS ABSOLUTE: 3.96 E9/L (ref 1.8–7.3)
NEUTROPHILS RELATIVE PERCENT: 55.7 % (ref 43–80)
PDW BLD-RTO: 13.1 FL (ref 11.5–15)
PLATELET # BLD: 279 E9/L (ref 130–450)
PMV BLD AUTO: 12.2 FL (ref 7–12)
POTASSIUM SERPL-SCNC: 4.5 MMOL/L (ref 3.5–5)
RBC # BLD: 4.75 E12/L (ref 3.5–5.5)
SODIUM BLD-SCNC: 136 MMOL/L (ref 132–146)
TOTAL PROTEIN: 8.6 G/DL (ref 6.4–8.3)
WBC # BLD: 7.1 E9/L (ref 4.5–11.5)

## 2023-01-27 PROCEDURE — 3075F SYST BP GE 130 - 139MM HG: CPT | Performed by: STUDENT IN AN ORGANIZED HEALTH CARE EDUCATION/TRAINING PROGRAM

## 2023-01-27 PROCEDURE — 99214 OFFICE O/P EST MOD 30 MIN: CPT | Performed by: STUDENT IN AN ORGANIZED HEALTH CARE EDUCATION/TRAINING PROGRAM

## 2023-01-27 PROCEDURE — 3079F DIAST BP 80-89 MM HG: CPT | Performed by: STUDENT IN AN ORGANIZED HEALTH CARE EDUCATION/TRAINING PROGRAM

## 2023-01-27 RX ORDER — LISINOPRIL AND HYDROCHLOROTHIAZIDE 25; 20 MG/1; MG/1
1 TABLET ORAL DAILY
Qty: 90 TABLET | Refills: 1 | Status: SHIPPED | OUTPATIENT
Start: 2023-01-27

## 2023-01-27 RX ORDER — GUAIFENESIN, PSEUDOEPHEDRINE HYDROCHLORIDE 600; 60 MG/1; MG/1
TABLET, EXTENDED RELEASE ORAL
COMMUNITY

## 2023-01-27 RX ORDER — LISINOPRIL 10 MG/1
10 TABLET ORAL DAILY
Qty: 90 TABLET | Refills: 1 | Status: SHIPPED | OUTPATIENT
Start: 2023-01-27

## 2023-01-27 SDOH — ECONOMIC STABILITY: FOOD INSECURITY: WITHIN THE PAST 12 MONTHS, THE FOOD YOU BOUGHT JUST DIDN'T LAST AND YOU DIDN'T HAVE MONEY TO GET MORE.: NEVER TRUE

## 2023-01-27 SDOH — ECONOMIC STABILITY: FOOD INSECURITY: WITHIN THE PAST 12 MONTHS, YOU WORRIED THAT YOUR FOOD WOULD RUN OUT BEFORE YOU GOT MONEY TO BUY MORE.: NEVER TRUE

## 2023-01-27 ASSESSMENT — PATIENT HEALTH QUESTIONNAIRE - PHQ9
2. FEELING DOWN, DEPRESSED OR HOPELESS: 0
SUM OF ALL RESPONSES TO PHQ QUESTIONS 1-9: 0
SUM OF ALL RESPONSES TO PHQ QUESTIONS 1-9: 0
SUM OF ALL RESPONSES TO PHQ9 QUESTIONS 1 & 2: 0
SUM OF ALL RESPONSES TO PHQ QUESTIONS 1-9: 0
1. LITTLE INTEREST OR PLEASURE IN DOING THINGS: 0
SUM OF ALL RESPONSES TO PHQ QUESTIONS 1-9: 0

## 2023-01-27 ASSESSMENT — ENCOUNTER SYMPTOMS
RHINORRHEA: 0
VOMITING: 0
CHEST TIGHTNESS: 1
ABDOMINAL PAIN: 0
NAUSEA: 0
SHORTNESS OF BREATH: 0
COUGH: 1

## 2023-01-27 ASSESSMENT — SOCIAL DETERMINANTS OF HEALTH (SDOH): HOW HARD IS IT FOR YOU TO PAY FOR THE VERY BASICS LIKE FOOD, HOUSING, MEDICAL CARE, AND HEATING?: NOT HARD AT ALL

## 2023-01-27 NOTE — PROGRESS NOTES
[facility]  Office Note  Dr. Rosie Ozuna      Patient:  Charisma Kaur 45 y.o. female     Date of Service: 1/27/23      Chief complaint:   Chief Complaint   Patient presents with    Establish Care    Cough     Not productive. Feels like it's all in her chest. X2 weeks         History of Present Illness   The patient is a 45 y.o. female  presented to the clinic with complaints as above. Cough-causes some headache. Started about 2 weeks ago. Got a URI from her grandson about a week before that. Sinus symptoms have improved. Feels like the cough is loosening up some. Took robatussin DM, theraflu and emergenC and now mucinex. Worse when she talks or gets up and moves. Fear of cough is limiting her activity. Has had some rattling in the chest. She uses a CPAP    HTN  BP Readings from Last 3 Encounters:   01/27/23 136/86   12/09/22 136/63   10/28/22 (!) 150/74   On lisinopril and hctz  Denies chest pain, shortness of breath, leg swelling, headache, vision changes and orthopnea    Pre DM-now following with weight loss doctor. Also has a fatty liver history. Has been counting calories and that was working but the last few months has been more difficult with the stress of her work place closing and getting sick    The 10-year ASCVD risk score (Javan DURAN, et al., 2019) is: 3.8%    Values used to calculate the score:      Age: 45 years      Sex: Female      Is Non- : No      Diabetic: No      Tobacco smoker: No      Systolic Blood Pressure: 427 mmHg      Is BP treated: Yes      HDL Cholesterol: 39 mg/dL      Total Cholesterol: 198 mg/dL        Past Medical History:      Diagnosis Date    Class 2 obesity due to excess calories without serious comorbidity with body mass index (BMI) of 37.0 to 37.9 in adult 5/31/2019    Elevated LFTs     pos.  Hep B atb- has had vaccine    Essential hypertension 5/31/2019    Fatty liver 05/2014    ultrasound    Hemangioma of lip     uppper Hypertension     Liver function test abnormality 2019    KATARZYNA (obstructive sleep apnea) 2019    Preeclampsia     Sleep apnea        PastSurgical History:        Procedure Laterality Date     SECTION  1996    CHOLECYSTECTOMY, LAPAROSCOPIC  1996       Allergies:    Patient has no known allergies. Social History:   Social History     Socioeconomic History    Marital status:      Spouse name: Not on file    Number of children: Not on file    Years of education: Not on file    Highest education level: Not on file   Occupational History    Not on file   Tobacco Use    Smoking status: Never    Smokeless tobacco: Never   Substance and Sexual Activity    Alcohol use: Never    Drug use: Never    Sexual activity: Not on file   Other Topics Concern    Not on file   Social History Narrative    Not on file     Social Determinants of Health     Financial Resource Strain: Low Risk     Difficulty of Paying Living Expenses: Not hard at all   Food Insecurity: No Food Insecurity    Worried About Running Out of Food in the Last Year: Never true    [address] [place] [address] Place of Food in the Last Year: Never true   Transportation Needs: Not on file   Physical Activity: Insufficiently Active    Days of Exercise per Week: 3 days    Minutes of Exercise per Session: 40 min   Stress: Not on file   Social Connections: Not on file   Intimate Partner Violence: Not At Risk    Fear of Current or Ex-Partner: No    Emotionally Abused: No    Physically Abused: No    Sexually Abused: No   Housing Stability: Not on file        Family History:       Problem Relation Age of Onset    Hypertension Mother     Atrial Fibrillation Father     Hypertension Father        Review of Systems:   Review of Systems   Constitutional:  Negative for chills and fever. HENT:  Negative for congestion and rhinorrhea. Respiratory:  Positive for cough and chest tightness. Negative for shortness of breath.     Cardiovascular:  Negative for chest pain and leg swelling. Gastrointestinal:  Negative for abdominal pain, nausea and vomiting. Genitourinary:  Negative for dysuria and hematuria. Musculoskeletal:  Negative for arthralgias and myalgias. Skin:  Negative for rash and wound. Neurological:  Negative for dizziness and light-headedness. Physical Exam   Vitals: /86   Pulse 92   Temp 97.8 °F (36.6 °C)   Wt 227 lb (103 kg)   SpO2 97%   BMI 36.36 kg/m²   Physical Exam    General:  Awake, alert, oriented X 3. Well developed, well nourished, well groomed. No apparent distress. HEENT:  Normocephalic, atraumatic. No scleral icterus. No conjunctival injection. No nasal discharge. Neck:  Supple  Heart:  RRR, no murmurs, gallops, or rubs  Lungs:  CTA bilaterally, bilat symmetrical expansion, no wheeze, rales, or rhonchi  Abdomen: Bowel sounds present, soft, nontender, no masses, no organomegaly, no peritoneal signs  Extremities:  No clubbing, cyanosis, or edema  Skin:  Warm and dry, no open lesions or rash  Neuro:  Cranial nerves 2-12 intact, no focal deficits      Assessment and Plan     Continue monitoring and supportive care for cough. Let me know if not improving in the next week or worsening at any time. Continue current blood pressure regimen at current dosing. We will check electrolytes and kidney function. Zoloft working, did have some depression anxiety issues surrounding workplace closing and losing her job. No SI HI-would like to continue same dose. She is not having any side effects from it. Continue following with weight loss medicine. She is following a calorie restricted diet. She has prediabetes, I suggested also limiting carbohydrate intake. We will check an A1c today  1. Essential hypertension    - lisinopril (PRINIVIL;ZESTRIL) 10 MG tablet; Take 1 tablet by mouth daily  Dispense: 90 tablet; Refill: 1  - lisinopril-hydroCHLOROthiazide (PRINZIDE;ZESTORETIC) 20-25 MG per tablet;  Take 1 tablet by mouth daily  Dispense: 90 tablet; Refill: 1    2. Post-viral cough syndrome      3. Anxiety    - sertraline (ZOLOFT) 50 MG tablet; Take 1 tablet by mouth daily 1 tablet by mouth daily  Dispense: 90 tablet; Refill: 1    4. Pre-diabetes    - Comprehensive Metabolic Panel; Future  - CBC with Auto Differential; Future  - Hemoglobin A1C; Future      Counseled regarding above diagnosis, including possible risks and complications,  especially if left uncontrolled. Counseled regarding the possible side effects, risks, benefits and alternatives to treatment;patient and/or guardian verbalizes understanding, agrees, feels comfortable with and wishes to proceed with above treatment plan. Call or go to ED immediately if symptoms worsen or persist. Advised patient to call with any new medication issues, and, as applicable, read all Rx info from pharmacy to assure aware of all possible risks and side effects of medicationbefore taking. Patient and/or guardian given opportunity to ask questions/raise concerns. The patient verbalized comfort and understanding ofinstructions. Return to Office: Return in about 6 months (around 7/27/2023), or if symptoms worsen or fail to improve, for Blood Pressure Check.     Medication List:    Current Outpatient Medications   Medication Sig Dispense Refill    pseudoephedrine-guaiFENesin (MUCINEX D)  MG per extended release tablet Take by mouth      lisinopril (PRINIVIL;ZESTRIL) 10 MG tablet Take 1 tablet by mouth daily 90 tablet 1    lisinopril-hydroCHLOROthiazide (PRINZIDE;ZESTORETIC) 20-25 MG per tablet Take 1 tablet by mouth daily 90 tablet 1    sertraline (ZOLOFT) 50 MG tablet Take 1 tablet by mouth daily 1 tablet by mouth daily 90 tablet 1    Drospirenone (SLYND) 4 MG TABS Take 1 tablet by mouth daily      diphenhydrAMINE HCl (BENADRYL PO) Take 25 mg by mouth nightly 3 tablets every night      PSYLLIUM PO Take by mouth      ibuprofen (ADVIL;MOTRIN) 200 MG tablet Take 200 mg by mouth every 6 hours as needed for Pain       No current facility-administered medications for this visit. Katheryn Mosher MD         This document may have been prepared at least partially through the use of voice recognition software. Although effort is taken to assure the accuracy ofthis document, it is possible that grammatical, syntax, or spelling errors may occur.

## 2023-01-31 ENCOUNTER — OFFICE VISIT (OUTPATIENT)
Dept: BARIATRICS/WEIGHT MGMT | Age: 53
End: 2023-01-31
Payer: COMMERCIAL

## 2023-01-31 VITALS
HEIGHT: 66 IN | WEIGHT: 227.2 LBS | SYSTOLIC BLOOD PRESSURE: 153 MMHG | HEART RATE: 87 BPM | DIASTOLIC BLOOD PRESSURE: 87 MMHG | BODY MASS INDEX: 36.52 KG/M2

## 2023-01-31 DIAGNOSIS — I10 ESSENTIAL HYPERTENSION: Primary | ICD-10-CM

## 2023-01-31 DIAGNOSIS — E66.09 CLASS 2 OBESITY DUE TO EXCESS CALORIES WITHOUT SERIOUS COMORBIDITY WITH BODY MASS INDEX (BMI) OF 36.0 TO 36.9 IN ADULT: ICD-10-CM

## 2023-01-31 PROCEDURE — 99213 OFFICE O/P EST LOW 20 MIN: CPT | Performed by: INTERNAL MEDICINE

## 2023-01-31 PROCEDURE — 3079F DIAST BP 80-89 MM HG: CPT | Performed by: INTERNAL MEDICINE

## 2023-01-31 PROCEDURE — 99211 OFF/OP EST MAY X REQ PHY/QHP: CPT

## 2023-01-31 PROCEDURE — 3077F SYST BP >= 140 MM HG: CPT | Performed by: INTERNAL MEDICINE

## 2023-01-31 NOTE — PROGRESS NOTES
CC -   Follow up of: Referred for:  newly dxed DM2, Obesity. HTN/KATARZYNA. BACKGROUND -   Last visit: 12/9/22  First visit: 10/28/22    Obesity (all weight in lbs)  Began  shobha in the last 10 yrs - (+20 lbs in the last 10 yrs)  Initial Ht 66.25, Wt 235.0,  BMI 37.64  HS Grad wt 140-145   Lowest   wt 140   Highest  wt 235  Pattern of wt gain: gradual  Wt change past yr: about the same (228-235)  Most wt lost: 30 lbs ([employer] 15 yrs ago) now 3-6 lbs  Other diets attempted: difficulty trying to do on her own - watching diet and exercising    Desire to lose weight: 8-10/10    Initial Diet:    Number of meals per day - 2-3 (s-b)    Number of snacks per day - 1    Meal volume - 9\" plate,  usually seconds    Fast food/convenience store - 0-1x/week    Restaurants (not fast food) - 2x/week   Sweets - 0-1d/week   Chips - 2-3d/week   Crackers/pretzels - 3d/week   Nuts - 0d/week   Peanut Butter - 0d/week   Popcorn - 0-1d/week (in place of chips smt)   Dried fruit - 0d/week   Whole fruit - 3d/week   Breakfast cereal - 0d/week   Granola/Protein/Energy bar - 0d/week   Sugar sweetened beverages - no pop/soda, no fruit juice (0 calorie flavored water or juice with psyllium husk), coffee 1 large mug with unsweetened vanilla almond milk, no tea, no energy drink   Protein - No supplements   Fiber - Psyllium husk bid 1 tbsp x 2 = 9g/d    Wt effect of HR foods = 1050 Leonardo/wk = 150 Leonardo/d= 7% DEN = 16 lb/year. Initial Exercise:    Gym membership - no    Walking - 2-4 x/wk - 35-40 min. Running - no    Resistance - no    Aerobic class - no. 20-30 min of HIIT when not able to walk.    ______________________    [facility] -  Past Medical History:   Diagnosis Date    Class 2 obesity due to excess calories without serious comorbidity with body mass index (BMI) of 37.0 to 37.9 in adult 5/31/2019    Elevated LFTs     pos.  Hep B atb- has had vaccine    Essential hypertension 5/31/2019    Fatty liver 05/2014    ultrasound    Hemangioma of lip     uppper Hypertension     Liver function test abnormality 2019    KATARZYNA (obstructive sleep apnea) 2019    Preeclampsia     Sleep apnea      Past Surgical History:   Procedure Laterality Date     SECTION  1996    CHOLECYSTECTOMY, LAPAROSCOPIC  1996   Had ?endometriosis after   Small lesion excision over upper lip. Prior to Admission medications    Medication Sig Start Date End Date Taking? Authorizing Provider   pseudoephedrine-guaiFENesin (MUCINEX D)  MG per extended release tablet Take by mouth    Historical Provider, MD   lisinopril (PRINIVIL;ZESTRIL) 10 MG tablet Take 1 tablet by mouth daily 23   Praveen Nj MD   lisinopril-hydroCHLOROthiazide [facility]) 20-25 MG per tablet Take 1 tablet by mouth daily 23   Praveen Nj MD   sertraline (ZOLOFT) 50 MG tablet Take 1 tablet by mouth daily 1 tablet by mouth daily 23   Praveen Nj MD   Drospirenone (SLYND) 4 MG TABS Take 1 tablet by mouth daily    Historical Provider, MD   diphenhydrAMINE HCl (BENADRYL PO) Take 25 mg by mouth nightly 3 tablets every night    Historical Provider, MD   PSYLLIUM PO Take by mouth    Historical Provider, MD   ibuprofen (ADVIL;MOTRIN) 200 MG tablet Take 200 mg by mouth every 6 hours as needed for Pain    Historical Provider, MD     22: not taking MVI currently. 23: taking MVI daily. Allergies: No Known Allergies    Social history: smoking: never ; Alcohol: occasional 2x/wk - light beer or wine , work: rehab manager (desk job). ROS -  Card - with anxiety - some tightness and goes away after breathing deep  GI - no N/V/D/C    PE -  Gen : BP (!) 153/87 (Site: Left Upper Arm, Position: Sitting, Cuff Size: Large Adult)   Pulse 87   Ht 5' 6.25\" (1.683 m)   Wt 227 lb 3.2 oz (103.1 kg)   BMI 36.39 kg/m²   Repeat BP: 154/75.    WN, WD, NAD  Lung: Nml resp effort, CTA B/L  Heart:  RRR w/ 1/6 systolic murmur in left sternal border as well as aortic area, no LE pitting edema b/l  Psych: Normal mood   Full affect  Neuro: Moves all ext well  ______________________    HISTORY & ASSESSMENT/PLAN -     Problem 1 - Hypertension   HPI   - BP is high today which pt feels is due to stress, compliant with medications, patient asymptomatic. Assessment  - Uncontrolled  Plan   - Continue medication, monitor. Weight reduction can help de-escalate the dose of medication. Weight reduction per plan below    Problem 2  - Obesity   HPI   - See above Background for description    Weight  Date    235.0  10/28/22    226.4  12/9/22    227.2  1/31/23    Total weight change to date: -8.6 lbs. Average daily energy variance:  10/28/2022 - 12/9/2022: -7.0 lbs (64881 Leonardo)/41 d = -598 Leonardo/d deficit. 12/9/2022 - 1/31/2023: +0.8 lbs (2800 Leonardo)/53 d = +53 Leonardo/d excess. Patient's estimated daily energy need (DEN) = 2068 Leonardo/d = 28071 Leonardo/wk    Update:  Feels she completely went off   Sweets: 2-3d/month (Howie)  SSB: none, flavored water, mostly water  Restaurant food: ~1x/wk  Appetite suppressant: None. Would like to continue off appetite suppressant. Home BP monitoring: NA  Protein: vanilla protein shake 21 gm - 150 leonardo/bottle  Fiber: psyllium husks bid  Water: back 60-90 oz/day  Activities: Has not been able to exercise (d/t job loss and stress) except last Saturday. Assessment  - Uncontrolled    Plan   - She has been through stress of job loss and found out she has had problem with stress eating. Had stressful time but better in the last 2 weeks. She wants to resume calorie counting. Does not feel the need for an appetite suppressant.  Planned as follows  Patient Instructions   Rules:  Count every calorie every day  Limit sweets to one day per month  Limit chips/crackers/pretzels/nuts/popcorn to 150 leonardo/day  Eliminate all sugar sweetened beverages (including fruit juice)  Limit restaurants (including fast food and food from a convenience store) to one time every two weeks while in town    Requirements:  Make sure protein intake is at least 70 grams per day  Make sure that fiber intake is at least 25 grams per day  Take one multivitamin every day    Targets:  Limit calorie intake to 1400 calories/day  Walk 30 minutes daily  Avoid eating 2 hours within bedtime.     Tips:  Do not eat outside of the dining room or the kitchen  Do not eat while watching TV, videos, working on the computer or using a smart phone  Do not eat food out of a multi-serving bag or container.    Follow up in 6-8 weeks.    Total time spent on encounter: 24 min.    Jael Jorgensen MD  Internal Medicine/Obesity Medicine  1/31/2023.

## 2023-01-31 NOTE — PATIENT INSTRUCTIONS
Rules:  Count every calorie every day  Limit sweets to one day per month  Limit chips/crackers/pretzels/nuts/popcorn to 150 adela/day  Eliminate all sugar sweetened beverages (including fruit juice)  Limit restaurants (including fast food and food from a convenience store) to one time every two weeks while in town    Requirements:  Make sure protein intake is at least 70 grams per day  Make sure that fiber intake is at least 25 grams per day  Take one multivitamin every day    Targets:  Limit calorie intake to 1400 calories/day  Walk 30 minutes daily  Avoid eating 2 hours within bedtime. Tips:  Do not eat outside of the dining room or the kitchen  Do not eat while watching TV, videos, working on the computer or using a smart phone  Do not eat food out of a multi-serving bag or container. Follow up in 6-8 weeks.

## 2023-04-20 ENCOUNTER — OFFICE VISIT (OUTPATIENT)
Dept: BARIATRICS/WEIGHT MGMT | Age: 53
End: 2023-04-20
Payer: COMMERCIAL

## 2023-04-20 VITALS
DIASTOLIC BLOOD PRESSURE: 78 MMHG | HEIGHT: 66 IN | SYSTOLIC BLOOD PRESSURE: 126 MMHG | WEIGHT: 227 LBS | BODY MASS INDEX: 36.48 KG/M2 | HEART RATE: 88 BPM

## 2023-04-20 DIAGNOSIS — E66.09 CLASS 2 OBESITY DUE TO EXCESS CALORIES WITHOUT SERIOUS COMORBIDITY WITH BODY MASS INDEX (BMI) OF 36.0 TO 36.9 IN ADULT: ICD-10-CM

## 2023-04-20 DIAGNOSIS — I10 ESSENTIAL HYPERTENSION: Primary | ICD-10-CM

## 2023-04-20 PROCEDURE — 99211 OFF/OP EST MAY X REQ PHY/QHP: CPT | Performed by: INTERNAL MEDICINE

## 2023-04-20 PROCEDURE — 3074F SYST BP LT 130 MM HG: CPT | Performed by: INTERNAL MEDICINE

## 2023-04-20 PROCEDURE — 3078F DIAST BP <80 MM HG: CPT | Performed by: INTERNAL MEDICINE

## 2023-04-20 PROCEDURE — 99214 OFFICE O/P EST MOD 30 MIN: CPT | Performed by: INTERNAL MEDICINE

## 2023-04-20 RX ORDER — PHENTERMINE HYDROCHLORIDE 37.5 MG/1
37.5 TABLET ORAL
Qty: 30 TABLET | Refills: 0 | Status: SHIPPED | OUTPATIENT
Start: 2023-04-20 | End: 2023-05-20

## 2023-04-20 NOTE — PATIENT INSTRUCTIONS
Rules:  Count every calorie every day (eg. '[email or web address]' sophie)  Limit sweets to one day per month  Limit chips/crackers/pretzels/nuts/popcorn to 150 adela/day  Eliminate all sugar sweetened beverages (including fruit juice)  Limit restaurants (including fast food and food from a convenience store) to one time every two weeks while in town    Requirements:  Make sure protein intake is at least 70 grams per day  Make sure that fiber intake is at least 25 grams per day  Take one multivitamin every day    Targets:  Limit calorie intake to 1400 calories/day  Walk 30 minutes daily  Avoid eating 2 hours within bedtime. Tips:  Do not eat outside of the dining room or the kitchen  Do not eat while watching TV, videos, working on the computer or using a smart phone  Do not eat food out of a multi-serving bag or container. Phentermine:  Take phentermine 37.5 mg, one-half to one tablet daily as needed for appetite suppression. Take each dose 30-90 min before effect will be needed. While taking phentermine, check the Blood Pressure every morning and every evening. If the systolic BP is >046 mmHg, the diastolic BP is >87 mm/Hg or the heart rate is > 100 beats per minute, do not take phentermine that day. If the systolic BP is consistently >155 mmHg, the diastolic BP is consistently above 90 mm/Hg or the heart rate is consistently > 100 beats per minute, then stop taking phentermine altogether. If the systolic BP >694 mmHg or the diastolic BP is >914 mmHg (even if it is only once), then phentermine should be stopped altogether without proving that any of these are consistently elevated. Follow up in 1 month.

## 2023-04-20 NOTE — PROGRESS NOTES
CC -   Follow up of: Referred for:  newly dxed DM2, Obesity. HTN/KATARZYNA. BACKGROUND -   Last visit: 1/31/23  First visit: 10/28/22    Obesity (all weight in lbs)  Began  shobha in the last 10 yrs - (+20 lbs in the last 10 yrs)  Initial Ht 66.25, Wt 235.0,  BMI 37.64  HS Grad wt 140-145   Lowest   wt 140   Highest  wt 235  Pattern of wt gain: gradual  Wt change past yr: about the same (228-235)  Most wt lost: 30 lbs ([address] 15 yrs ago) now 3-6 lbs  Other diets attempted: difficulty trying to do on her own - watching diet and exercising    Desire to lose weight: 8-10/10    Initial Diet:    Number of meals per day - 2-3 (s-b)    Number of snacks per day - 1    Meal volume - 9\" plate,  usually seconds    Fast food/convenience store - 0-1x/week    Restaurants (not fast food) - 2x/week   Sweets - 0-1d/week   Chips - 2-3d/week   Crackers/pretzels - 3d/week   Nuts - 0d/week   Peanut Butter - 0d/week   Popcorn - 0-1d/week (in place of chips smt)   Dried fruit - 0d/week   Whole fruit - 3d/week   Breakfast cereal - 0d/week   Granola/Protein/Energy bar - 0d/week   Sugar sweetened beverages - no pop/soda, no fruit juice (0 calorie flavored water or juice with psyllium husk), coffee 1 large mug with unsweetened vanilla almond milk, no tea, no energy drink   Protein - No supplements   Fiber - Psyllium husk bid 1 tbsp x 2 = 9g/d    Wt effect of HR foods = 1050 Leonardo/wk = 150 Leonardo/d= 7% DEN = 16 lb/year. Initial Exercise:    Gym membership - no    Walking - 2-4 x/wk - 35-40 min. Running - no    Resistance - no    Aerobic class - no. 20-30 min of HIIT when not able to walk.    ______________________    [facility] -  Past Medical History:   Diagnosis Date    Class 2 obesity due to excess calories without serious comorbidity with body mass index (BMI) of 37.0 to 37.9 in adult 5/31/2019    Elevated LFTs     pos.  Hep B atb- has had vaccine    Essential hypertension 5/31/2019    Fatty liver 05/2014    ultrasound    Hemangioma of lip     uppper

## 2023-05-18 ENCOUNTER — OFFICE VISIT (OUTPATIENT)
Dept: BARIATRICS/WEIGHT MGMT | Age: 53
End: 2023-05-18
Payer: COMMERCIAL

## 2023-05-18 VITALS
BODY MASS INDEX: 35.77 KG/M2 | WEIGHT: 222.6 LBS | SYSTOLIC BLOOD PRESSURE: 128 MMHG | TEMPERATURE: 97 F | HEIGHT: 66 IN | DIASTOLIC BLOOD PRESSURE: 60 MMHG | HEART RATE: 74 BPM

## 2023-05-18 DIAGNOSIS — I10 ESSENTIAL HYPERTENSION: Primary | ICD-10-CM

## 2023-05-18 DIAGNOSIS — E66.09 CLASS 2 OBESITY DUE TO EXCESS CALORIES WITHOUT SERIOUS COMORBIDITY WITH BODY MASS INDEX (BMI) OF 36.0 TO 36.9 IN ADULT: ICD-10-CM

## 2023-05-18 PROCEDURE — 3078F DIAST BP <80 MM HG: CPT | Performed by: INTERNAL MEDICINE

## 2023-05-18 PROCEDURE — 3074F SYST BP LT 130 MM HG: CPT | Performed by: INTERNAL MEDICINE

## 2023-05-18 PROCEDURE — 99214 OFFICE O/P EST MOD 30 MIN: CPT | Performed by: INTERNAL MEDICINE

## 2023-05-18 PROCEDURE — 99211 OFF/OP EST MAY X REQ PHY/QHP: CPT

## 2023-05-18 RX ORDER — PHENTERMINE HYDROCHLORIDE 37.5 MG/1
37.5 TABLET ORAL
Qty: 30 TABLET | Refills: 0 | Status: SHIPPED | OUTPATIENT
Start: 2023-05-18 | End: 2023-06-17

## 2023-05-18 NOTE — PATIENT INSTRUCTIONS
Rules:  Count every calorie every day (eg. '[employer]' sophie)  Limit sweets to one day per month  Limit chips/crackers/pretzels/nuts/popcorn to 150 adela/day  Eliminate all sugar sweetened beverages (including fruit juice)  Limit restaurants (including fast food and food from a convenience store) to one time every two weeks while in town    Requirements:  Make sure protein intake is at least 70 grams per day  Make sure that fiber intake is at least 25 grams per day  Take one multivitamin every day    Targets:  Limit calorie intake to 1400 calories/day  Walk 30 minutes daily  Avoid eating 2 hours within bedtime. Tips:  Do not eat outside of the dining room or the kitchen  Do not eat while watching TV, videos, working on the computer or using a smart phone  Do not eat food out of a multi-serving bag or container. Phentermine:  Take phentermine 37.5 mg, one-half to one tablet daily as needed for appetite suppression. Take each dose 30-90 min before effect will be needed. While taking phentermine, check the Blood Pressure every morning and every evening. If the systolic BP is >693 mmHg, the diastolic BP is >22 mm/Hg or the heart rate is > 100 beats per minute, do not take phentermine that day. If the systolic BP is consistently >155 mmHg, the diastolic BP is consistently above 90 mm/Hg or the heart rate is consistently > 100 beats per minute, then stop taking phentermine altogether. If the systolic BP >136 mmHg or the diastolic BP is >830 mmHg (even if it is only once), then phentermine should be stopped altogether without proving that any of these are consistently elevated. Follow up in 1 month.

## 2023-06-22 ENCOUNTER — OFFICE VISIT (OUTPATIENT)
Dept: BARIATRICS/WEIGHT MGMT | Age: 53
End: 2023-06-22
Payer: COMMERCIAL

## 2023-06-22 VITALS
DIASTOLIC BLOOD PRESSURE: 59 MMHG | SYSTOLIC BLOOD PRESSURE: 121 MMHG | HEART RATE: 84 BPM | TEMPERATURE: 97.2 F | BODY MASS INDEX: 35.26 KG/M2 | WEIGHT: 219.4 LBS | HEIGHT: 66 IN

## 2023-06-22 DIAGNOSIS — E66.09 CLASS 2 OBESITY DUE TO EXCESS CALORIES WITHOUT SERIOUS COMORBIDITY WITH BODY MASS INDEX (BMI) OF 35.0 TO 35.9 IN ADULT: ICD-10-CM

## 2023-06-22 DIAGNOSIS — I10 ESSENTIAL HYPERTENSION: Primary | ICD-10-CM

## 2023-06-22 PROCEDURE — 3074F SYST BP LT 130 MM HG: CPT | Performed by: INTERNAL MEDICINE

## 2023-06-22 PROCEDURE — 3078F DIAST BP <80 MM HG: CPT | Performed by: INTERNAL MEDICINE

## 2023-06-22 PROCEDURE — 99214 OFFICE O/P EST MOD 30 MIN: CPT | Performed by: INTERNAL MEDICINE

## 2023-06-22 RX ORDER — PHENTERMINE HYDROCHLORIDE 37.5 MG/1
37.5 TABLET ORAL
Qty: 30 TABLET | Refills: 0 | Status: SHIPPED | OUTPATIENT
Start: 2023-06-22 | End: 2023-07-22

## 2023-06-22 NOTE — PATIENT INSTRUCTIONS
Rules:  Count every calorie every day  Limit sweets to one day per month  Limit chips/crackers/pretzels/nuts/popcorn to 150 adela/day  Eliminate all sugar sweetened beverages (including fruit juice)  Limit restaurants (including fast food and food from a convenience store) to one time every two weeks while in town    Requirements:  Make sure protein intake is at least 70 grams per day  Make sure that fiber intake is at least 25 grams per day  Take one multivitamin every day    Targets:  Limit calorie intake to 1400 calories/day  Walk 30 minutes daily  Avoid eating 2 hours within bedtime. Tips:  Do not eat outside of the dining room or the kitchen  Do not eat while watching TV, videos, working on the computer or using a smart phone  Do not eat food out of a multi-serving bag or container. Phentermine:  Take phentermine 37.5 mg, one-half to one tablet daily as needed for appetite suppression. Take each dose 30-90 min before effect will be needed. While taking phentermine, check the Blood Pressure every morning and every evening. If the systolic BP is >567 mmHg, the diastolic BP is >85 mm/Hg or the heart rate is > 100 beats per minute, do not take phentermine that day. If the systolic BP is consistently >155 mmHg, the diastolic BP is consistently above 90 mm/Hg or the heart rate is consistently > 100 beats per minute, then stop taking phentermine altogether. If the systolic BP >158 mmHg or the diastolic BP is >655 mmHg (even if it is only once), then phentermine should be stopped altogether without proving that any of these are consistently elevated. Follow up in 1 month.

## 2023-06-22 NOTE — PROGRESS NOTES
CC -   Follow up of: Referred for:  newly dxed DM2, Obesity. HTN/KATARZYNA. BACKGROUND -   Last visit: 1/31/23  First visit: 10/28/22    Obesity (all weight in lbs)  Began  shobha in the last 10 yrs - (+20 lbs in the last 10 yrs)  Initial Ht 66.25, Wt 235.0,  BMI 37.64  HS Grad wt 140-145   Lowest   wt 140   Highest  wt 235  Pattern of wt gain: gradual  Wt change past yr: about the same (228-235)  Most wt lost: 30 lbs ([employer] 15 yrs ago) now 3-6 lbs  Other diets attempted: difficulty trying to do on her own - watching diet and exercising    Desire to lose weight: 8-10/10    Initial Diet:    Number of meals per day - 2-3 (s-b)    Number of snacks per day - 1    Meal volume - 9\" plate,  usually seconds    Fast food/convenience store - 0-1x/week    Restaurants (not fast food) - 2x/week   Sweets - 0-1d/week   Chips - 2-3d/week   Crackers/pretzels - 3d/week   Nuts - 0d/week   Peanut Butter - 0d/week   Popcorn - 0-1d/week (in place of chips smt)   Dried fruit - 0d/week   Whole fruit - 3d/week   Breakfast cereal - 0d/week   Granola/Protein/Energy bar - 0d/week   Sugar sweetened beverages - no pop/soda, no fruit juice (0 calorie flavored water or juice with psyllium husk), coffee 1 large mug with unsweetened vanilla almond milk, no tea, no energy drink   Protein - No supplements   Fiber - Psyllium husk bid 1 tbsp x 2 = 9g/d    Wt effect of HR foods = 1050 Leonardo/wk = 150 Leonardo/d= 7% DEN = 16 lb/year. Initial Exercise:    Gym membership - no    Walking - 2-4 x/wk - 35-40 min. Running - no    Resistance - no    Aerobic class - no. 20-30 min of HIIT when not able to walk.    ______________________    [facility] -  Past Medical History:   Diagnosis Date    Class 2 obesity due to excess calories without serious comorbidity with body mass index (BMI) of 37.0 to 37.9 in adult 5/31/2019    Elevated LFTs     pos.  Hep B atb- has had vaccine    Essential hypertension 5/31/2019    Fatty liver 05/2014    ultrasound    Hemangioma of lip     uppper

## 2023-07-25 ENCOUNTER — OFFICE VISIT (OUTPATIENT)
Dept: BARIATRICS/WEIGHT MGMT | Age: 53
End: 2023-07-25
Payer: COMMERCIAL

## 2023-07-25 VITALS
WEIGHT: 217.8 LBS | TEMPERATURE: 96.7 F | SYSTOLIC BLOOD PRESSURE: 127 MMHG | DIASTOLIC BLOOD PRESSURE: 73 MMHG | BODY MASS INDEX: 35 KG/M2 | HEIGHT: 66 IN | HEART RATE: 81 BPM

## 2023-07-25 DIAGNOSIS — I10 ESSENTIAL HYPERTENSION: Primary | ICD-10-CM

## 2023-07-25 DIAGNOSIS — E66.09 CLASS 1 OBESITY DUE TO EXCESS CALORIES WITHOUT SERIOUS COMORBIDITY WITH BODY MASS INDEX (BMI) OF 34.0 TO 34.9 IN ADULT: ICD-10-CM

## 2023-07-25 PROCEDURE — 99213 OFFICE O/P EST LOW 20 MIN: CPT | Performed by: INTERNAL MEDICINE

## 2023-07-25 PROCEDURE — 3078F DIAST BP <80 MM HG: CPT | Performed by: INTERNAL MEDICINE

## 2023-07-25 PROCEDURE — 3074F SYST BP LT 130 MM HG: CPT | Performed by: INTERNAL MEDICINE

## 2023-07-25 NOTE — PROGRESS NOTES
Moves all ext well  ______________________    HISTORY & ASSESSMENT/PLAN -     Problem 1 - Hypertension   HPI   - BP is normal today (has been off extra HCTZ and Amlodipine), compliant with medications, patient asymptomatic. Assessment  - Controlled  Plan   - Continue medication, monitor. Weight reduction can help de-escalate the dose of medication. Weight reduction per plan below    Problem 2  - Obesity   HPI   - See above Background for description    Weight  Date    235.0  10/28/22    226.4  22    227.2  23    227.0  23     Phentermine #1    222.6  23     Phentermine #2    219.4  23     Phentermine continued    217.8  23     ---    Total weight change to date: -17.2 lbs. Average daily energy variance:  10/28/2022 - 2022: -7.0 lbs (41548 Leonardo)/41 d = -598 Leonardo/d deficit. 2022 - 2023: +0.8 lbs (2800 Leonardo)/53 d = +53 Leonardo/d excess. 2023 - 2023: -0.2 lbs (700 Leonardo)/79 d = -9 Leonardo/d deficit. 2023 - 2023: -4.4 lbs (63271 Leonardo)/28 d = -550 Leonardo/d deficit. 2023 - 2023: -3.2 lbs (61701 Leonardo)/35 d = -320 Leonardo/d deficit. 2023 - 2023: -1.6 lbs (5600 Leonardo)/33 d = -170 Leonardo/d deficit.   (4.1% bw loss in 3m)    Patient's estimated daily energy need (DEN) = 2068 Leonardo/d = 87472 Leonardo/wk    Update:  Calorie monitorind/wk (except for vacation)->8117-6588 Leonardo/d for the most part (smt 7991-7283) 2k-2100 on weekends  Sweets: 0x/month  SSB: none, always water  Restaurant food: 1-2x/wk  Appetite suppressant: Phentermine 37.5, taking 1/2 daily (whole caused constipation but did help), appetite suppression 5-6/10, side effects: some dry mouth  Home BP monitoring: has been wnl, orthostatic symptoms smt  Protein: salmon, shrimp, chicken etc.  Fiber: psyllium husks / flavored metamucil bid  Water: 90+ oz/day  Activities: 4 miles 2-3 d/wk, 2 miles or so on other days, about 5d of exercise    Assessment  - improving gradually, has had <5% body weight loss so at this

## 2023-07-25 NOTE — PATIENT INSTRUCTIONS
Rules:  Count every calorie every day  Limit sweets to one day per month  Limit chips/crackers/pretzels/nuts/popcorn to 150 adela/day  Eliminate all sugar sweetened beverages (including fruit juice)  Limit restaurants (including fast food and food from a convenience store) to one time every two weeks while in town    Requirements:  Make sure protein intake is at least 70 grams per day  Make sure that fiber intake is at least 25 grams per day  Take one multivitamin every day    Targets:  Limit calorie intake to 1400 calories/day  Walk 30 minutes daily  Avoid eating 2 hours within bedtime. You can use remaining Phentermine as prior. Please watch BP and heart rate as planned. Follow up in 2-3 months.

## 2023-07-27 SDOH — ECONOMIC STABILITY: INCOME INSECURITY: HOW HARD IS IT FOR YOU TO PAY FOR THE VERY BASICS LIKE FOOD, HOUSING, MEDICAL CARE, AND HEATING?: SOMEWHAT HARD

## 2023-07-27 SDOH — ECONOMIC STABILITY: HOUSING INSECURITY
IN THE LAST 12 MONTHS, WAS THERE A TIME WHEN YOU DID NOT HAVE A STEADY PLACE TO SLEEP OR SLEPT IN A SHELTER (INCLUDING NOW)?: NO

## 2023-07-27 SDOH — ECONOMIC STABILITY: FOOD INSECURITY: WITHIN THE PAST 12 MONTHS, YOU WORRIED THAT YOUR FOOD WOULD RUN OUT BEFORE YOU GOT MONEY TO BUY MORE.: NEVER TRUE

## 2023-07-27 SDOH — ECONOMIC STABILITY: FOOD INSECURITY: WITHIN THE PAST 12 MONTHS, THE FOOD YOU BOUGHT JUST DIDN'T LAST AND YOU DIDN'T HAVE MONEY TO GET MORE.: NEVER TRUE

## 2023-07-27 SDOH — ECONOMIC STABILITY: TRANSPORTATION INSECURITY
IN THE PAST 12 MONTHS, HAS LACK OF TRANSPORTATION KEPT YOU FROM MEETINGS, WORK, OR FROM GETTING THINGS NEEDED FOR DAILY LIVING?: NO

## 2023-07-28 ENCOUNTER — TELEMEDICINE (OUTPATIENT)
Dept: FAMILY MEDICINE CLINIC | Age: 53
End: 2023-07-28
Payer: COMMERCIAL

## 2023-07-28 ENCOUNTER — TELEPHONE (OUTPATIENT)
Dept: FAMILY MEDICINE CLINIC | Age: 53
End: 2023-07-28

## 2023-07-28 DIAGNOSIS — I10 ESSENTIAL HYPERTENSION: ICD-10-CM

## 2023-07-28 DIAGNOSIS — G47.33 OSA (OBSTRUCTIVE SLEEP APNEA): ICD-10-CM

## 2023-07-28 DIAGNOSIS — I10 ESSENTIAL HYPERTENSION: Primary | ICD-10-CM

## 2023-07-28 DIAGNOSIS — R73.03 PRE-DIABETES: ICD-10-CM

## 2023-07-28 DIAGNOSIS — K76.0 FATTY LIVER: ICD-10-CM

## 2023-07-28 PROCEDURE — 99214 OFFICE O/P EST MOD 30 MIN: CPT | Performed by: STUDENT IN AN ORGANIZED HEALTH CARE EDUCATION/TRAINING PROGRAM

## 2023-07-28 RX ORDER — LISINOPRIL AND HYDROCHLOROTHIAZIDE 25; 20 MG/1; MG/1
TABLET ORAL
Qty: 90 TABLET | Refills: 1 | OUTPATIENT
Start: 2023-07-28

## 2023-07-28 RX ORDER — LISINOPRIL AND HYDROCHLOROTHIAZIDE 25; 20 MG/1; MG/1
1 TABLET ORAL DAILY
Qty: 90 TABLET | Refills: 1 | Status: SHIPPED | OUTPATIENT
Start: 2023-07-28

## 2023-07-28 ASSESSMENT — ENCOUNTER SYMPTOMS
NAUSEA: 0
VOMITING: 0
RHINORRHEA: 0
SHORTNESS OF BREATH: 0
COUGH: 0
ABDOMINAL PAIN: 0

## 2023-07-28 NOTE — PROGRESS NOTES
2023    TELEHEALTH EVALUATION -- Audio/Visual    HPI:    Garrick Abbott (:  1970) has requested an audio/video evaluation for the following concern(s):    HTN-has not been taking her amlodipine or lisinopril. Only on hctz/lisinopril combo pill. BP reviewed from visit with weight loss. They have been at goal. She occasionally gets slightly light headed when going from sitting to standing quickly  BP Readings from Last 3 Encounters:   23 127/73   23 (!) 121/59   23 128/60     Pre DM/hx of fatty liver-making dietary changes, following with weight loss, losing weight. Going very well. Due for a recheck of A1c    CPAP-has been getting a warning that life of motor is going. Called [facility], needs a new script and a copy of sleep study. She is compliant with her mask and uses it nightly. When the power went out a few months ago she had significantly worse sleep without it    Review of Systems   Constitutional:  Negative for chills and fever. HENT:  Negative for congestion and rhinorrhea. Respiratory:  Negative for cough and shortness of breath. Cardiovascular:  Negative for chest pain and leg swelling. Gastrointestinal:  Negative for abdominal pain, nausea and vomiting. Genitourinary:  Negative for dysuria and hematuria. Musculoskeletal:  Negative for arthralgias and myalgias. Skin:  Negative for rash and wound. Neurological:  Negative for dizziness and light-headedness. Prior to Visit Medications    Medication Sig Taking?  Authorizing Provider   lisinopril-hydroCHLOROthiazide (PRINZIDE;ZESTORETIC) 20-25 MG per tablet Take 1 tablet by mouth daily Yes Jada Jackson MD   pseudoephedrine-guaiFENesin (MUCINEX D)  MG per extended release tablet Take by mouth  Historical Provider, MD   sertraline (ZOLOFT) 50 MG tablet Take 1 tablet by mouth daily 1 tablet by mouth daily  Jada Jackson MD   Drospirenone (SLYND) 4 MG TABS Take 1 tablet by mouth daily  Historical

## 2023-08-04 DIAGNOSIS — R73.03 PRE-DIABETES: ICD-10-CM

## 2023-08-04 DIAGNOSIS — K76.0 FATTY LIVER: ICD-10-CM

## 2023-08-04 DIAGNOSIS — I10 ESSENTIAL HYPERTENSION: ICD-10-CM

## 2023-08-04 LAB
ABSOLUTE IMMATURE GRANULOCYTE: <0.03 K/UL (ref 0–0.58)
ALBUMIN SERPL-MCNC: 4.8 G/DL (ref 3.5–5.2)
ALP BLD-CCNC: 56 U/L (ref 35–104)
ALT SERPL-CCNC: 30 U/L (ref 0–32)
ANION GAP SERPL CALCULATED.3IONS-SCNC: 13 MMOL/L (ref 7–16)
AST SERPL-CCNC: 31 U/L (ref 0–31)
BASOPHILS ABSOLUTE: 0.09 K/UL (ref 0–0.2)
BASOPHILS RELATIVE PERCENT: 1 % (ref 0–2)
BILIRUB SERPL-MCNC: 0.5 MG/DL (ref 0–1.2)
BUN BLDV-MCNC: 17 MG/DL (ref 6–20)
CALCIUM SERPL-MCNC: 9.4 MG/DL (ref 8.6–10.2)
CHLORIDE BLD-SCNC: 102 MMOL/L (ref 98–107)
CHOLESTEROL: 192 MG/DL
CO2: 22 MMOL/L (ref 22–29)
CREAT SERPL-MCNC: 0.7 MG/DL (ref 0.5–1)
EOSINOPHILS ABSOLUTE: 0.62 K/UL (ref 0.05–0.5)
EOSINOPHILS RELATIVE PERCENT: 8 % (ref 0–6)
GFR SERPL CREATININE-BSD FRML MDRD: >60 ML/MIN/1.73M2
GLUCOSE BLD-MCNC: 129 MG/DL (ref 74–99)
HBA1C MFR BLD: 5.9 % (ref 4–5.6)
HCT VFR BLD CALC: 43.3 % (ref 34–48)
HDLC SERPL-MCNC: 43 MG/DL
HEMOGLOBIN: 14.1 G/DL (ref 11.5–15.5)
IMMATURE GRANULOCYTES: 0 % (ref 0–5)
LDL CHOLESTEROL: 133 MG/DL
LYMPHOCYTES ABSOLUTE: 1.79 K/UL (ref 1.5–4)
LYMPHOCYTES RELATIVE PERCENT: 24 % (ref 20–42)
MCH RBC QN AUTO: 30.5 PG (ref 26–35)
MCHC RBC AUTO-ENTMCNC: 32.6 G/DL (ref 32–34.5)
MCV RBC AUTO: 93.7 FL (ref 80–99.9)
MONOCYTES ABSOLUTE: 0.42 K/UL (ref 0.1–0.95)
MONOCYTES RELATIVE PERCENT: 6 % (ref 2–12)
NEUTROPHILS ABSOLUTE: 4.56 K/UL (ref 1.8–7.3)
NEUTROPHILS RELATIVE PERCENT: 61 % (ref 43–80)
PDW BLD-RTO: 12.9 % (ref 11.5–15)
PLATELET # BLD: 235 K/UL (ref 130–450)
PMV BLD AUTO: 13 FL (ref 7–12)
POTASSIUM SERPL-SCNC: 4.2 MMOL/L (ref 3.5–5)
RBC # BLD: 4.62 M/UL (ref 3.5–5.5)
SODIUM BLD-SCNC: 137 MMOL/L (ref 132–146)
TOTAL PROTEIN: 8.1 G/DL (ref 6.4–8.3)
TRIGL SERPL-MCNC: 81 MG/DL
VLDLC SERPL CALC-MCNC: 16 MG/DL
WBC # BLD: 7.5 K/UL (ref 4.5–11.5)

## 2023-08-06 DIAGNOSIS — F41.9 ANXIETY: ICD-10-CM

## 2023-08-07 NOTE — TELEPHONE ENCOUNTER
Last Appointment:  7/28/2023  Future Appointments   Date Time Provider [address]   11/2/2023  1:45 PM Ibeth Wall MD [facility]   1/30/2024  1:00 PM Roseline Duran MD [address] [place] [address]

## 2023-08-09 DIAGNOSIS — F41.9 ANXIETY: ICD-10-CM

## 2023-09-25 LAB — MAMMOGRAPHY, EXTERNAL: NORMAL

## 2023-10-25 DIAGNOSIS — I10 ESSENTIAL HYPERTENSION: ICD-10-CM

## 2023-10-25 RX ORDER — LISINOPRIL 10 MG/1
10 TABLET ORAL DAILY
Qty: 90 TABLET | Refills: 1 | OUTPATIENT
Start: 2023-10-25

## 2023-11-13 ENCOUNTER — OFFICE VISIT (OUTPATIENT)
Dept: FAMILY MEDICINE CLINIC | Age: 53
End: 2023-11-13
Payer: COMMERCIAL

## 2023-11-13 VITALS
TEMPERATURE: 97.8 F | WEIGHT: 226 LBS | HEIGHT: 66 IN | HEART RATE: 68 BPM | OXYGEN SATURATION: 95 % | DIASTOLIC BLOOD PRESSURE: 64 MMHG | BODY MASS INDEX: 36.32 KG/M2 | SYSTOLIC BLOOD PRESSURE: 124 MMHG | RESPIRATION RATE: 18 BRPM

## 2023-11-13 DIAGNOSIS — H81.10 BENIGN PAROXYSMAL POSITIONAL VERTIGO, UNSPECIFIED LATERALITY: Primary | ICD-10-CM

## 2023-11-13 PROCEDURE — 3074F SYST BP LT 130 MM HG: CPT | Performed by: STUDENT IN AN ORGANIZED HEALTH CARE EDUCATION/TRAINING PROGRAM

## 2023-11-13 PROCEDURE — 99213 OFFICE O/P EST LOW 20 MIN: CPT | Performed by: STUDENT IN AN ORGANIZED HEALTH CARE EDUCATION/TRAINING PROGRAM

## 2023-11-13 PROCEDURE — 3078F DIAST BP <80 MM HG: CPT | Performed by: STUDENT IN AN ORGANIZED HEALTH CARE EDUCATION/TRAINING PROGRAM

## 2023-11-13 RX ORDER — METHYLPREDNISOLONE 4 MG/1
TABLET ORAL
Qty: 1 KIT | Refills: 0 | Status: SHIPPED | OUTPATIENT
Start: 2023-11-13 | End: 2023-11-19

## 2023-11-13 RX ORDER — IPRATROPIUM BROMIDE 21 UG/1
2 SPRAY, METERED NASAL EVERY 12 HOURS
Qty: 30 ML | Refills: 0 | Status: SHIPPED | OUTPATIENT
Start: 2023-11-13

## 2023-11-13 ASSESSMENT — ENCOUNTER SYMPTOMS
NAUSEA: 0
VOMITING: 0
ABDOMINAL PAIN: 0
RHINORRHEA: 0
SHORTNESS OF BREATH: 0
COUGH: 1

## 2023-11-13 NOTE — PROGRESS NOTES
Loretta Pratt (:  1970) is a 52 y.o. female,Established patient, here for evaluation of the following chief complaint(s):  Dizziness (States she is going on her 4 th week of self diagnosed vertigo /States that it has been up and down - consistently laying to sitting and sitting to laying )         ASSESSMENT/PLAN:  1. Benign paroxysmal positional vertigo, unspecified laterality  -     methylPREDNISolone (MEDROL DOSEPACK) 4 MG tablet; Take by mouth., Disp-1 kit, R-0Normal  -     ipratropium (ATROVENT) 0.03 % nasal spray; 2 sprays by Each Nostril route in the morning and 2 sprays in the evening., Disp-30 mL, R-0Normal    Will try to decongest with steroids and Atrovent. See if that improves things. Maneuvers given to try at home. Discussed return and ER precautions. Patient and or parent verbalized understanding    No follow-ups on file. Subjective   SUBJECTIVE/OBJECTIVE:  Vertigo-started about 3 weeks ago after recovering from a URI  She is going on week 4  Started noticing it after bending over  Fine when up and moving around  Lasts for about 5-10 seconds  Not light headed or feeling like there is syncope  She feels like there is still a lot of congestion and ears feel wet        Review of Systems   Constitutional:  Negative for chills and fever. HENT:  Positive for congestion. Negative for rhinorrhea. Respiratory:  Positive for cough. Negative for shortness of breath. Cardiovascular:  Negative for chest pain and leg swelling. Gastrointestinal:  Negative for abdominal pain, nausea and vomiting. Genitourinary:  Negative for dysuria and hematuria. Musculoskeletal:  Negative for arthralgias and myalgias. Skin:  Negative for rash and wound. Neurological:  Positive for dizziness. Negative for light-headedness. Objective   Physical Exam  Vitals reviewed. Constitutional:       General: She is not in acute distress. HENT:      Head: Normocephalic and atraumatic.       Right

## 2023-12-07 ENCOUNTER — OFFICE VISIT (OUTPATIENT)
Dept: BARIATRICS/WEIGHT MGMT | Age: 53
End: 2023-12-07
Payer: COMMERCIAL

## 2023-12-07 VITALS
WEIGHT: 222.8 LBS | HEART RATE: 73 BPM | HEIGHT: 66 IN | DIASTOLIC BLOOD PRESSURE: 66 MMHG | SYSTOLIC BLOOD PRESSURE: 153 MMHG | BODY MASS INDEX: 35.81 KG/M2 | TEMPERATURE: 97.2 F

## 2023-12-07 DIAGNOSIS — I10 ESSENTIAL HYPERTENSION: Primary | ICD-10-CM

## 2023-12-07 DIAGNOSIS — E66.09 CLASS 2 OBESITY DUE TO EXCESS CALORIES WITHOUT SERIOUS COMORBIDITY WITH BODY MASS INDEX (BMI) OF 35.0 TO 35.9 IN ADULT: ICD-10-CM

## 2023-12-07 PROCEDURE — 3078F DIAST BP <80 MM HG: CPT | Performed by: INTERNAL MEDICINE

## 2023-12-07 PROCEDURE — 3077F SYST BP >= 140 MM HG: CPT | Performed by: INTERNAL MEDICINE

## 2023-12-07 PROCEDURE — 99213 OFFICE O/P EST LOW 20 MIN: CPT | Performed by: INTERNAL MEDICINE

## 2023-12-07 PROCEDURE — 99211 OFF/OP EST MAY X REQ PHY/QHP: CPT

## 2023-12-07 NOTE — PATIENT INSTRUCTIONS
Rules:  Count every calorie every day  Limit sweets to one day per month  Limit chips/crackers/pretzels/nuts/popcorn to 150 adela/day  Eliminate all sugar sweetened beverages (including fruit juice)  Limit restaurants (including fast food and food from a convenience store) to one time every two weeks while in town    Requirements:  Make sure protein intake is at least 70 grams per day  Make sure that fiber intake is at least 25 grams per day  Take one multivitamin every day    Targets:  Limit calorie intake to 1400 calories/day  Limit net carbohydrate intake to 100 grams/day  Walk 30 minutes daily  Avoid eating 2 hours within bedtime. You can resume your remaining Phentermine, starting with half a tablet a day. Follow up in 1 month.

## 2023-12-07 NOTE — PROGRESS NOTES
good 3d/wk    Assessment  - Uncontrolled; she would like to resume calorie monitoring and low calorie diet. Also watch net carb intake. Has some Phentermine from prior and will see if that helps, but will watch her BP and HR. Plan   -   Rules:  Count every calorie every day  Limit sweets to one day per month  Limit chips/crackers/pretzels/nuts/popcorn to 150 adela/day  Eliminate all sugar sweetened beverages (including fruit juice)  Limit restaurants (including fast food and food from a convenience store) to one time every two weeks while in town    Requirements:  Make sure protein intake is at least 70 grams per day  Make sure that fiber intake is at least 25 grams per day  Take one multivitamin every day    Targets:  Limit calorie intake to 1400 calories/day  Limit net carbohydrate intake to 100 grams/day  Walk 30 minutes daily  Avoid eating 2 hours within bedtime. You can resume your remaining Phentermine, starting with half a tablet a day. Follow up in 1 month. Total time spent on this encounter: 26 minutes. Lorin Cahng MD  Internal Medicine/Obesity Medicine  12/7/2023.

## 2024-01-18 ENCOUNTER — OFFICE VISIT (OUTPATIENT)
Dept: BARIATRICS/WEIGHT MGMT | Age: 54
End: 2024-01-18
Payer: COMMERCIAL

## 2024-01-18 VITALS
HEART RATE: 83 BPM | DIASTOLIC BLOOD PRESSURE: 57 MMHG | HEIGHT: 66 IN | BODY MASS INDEX: 35.2 KG/M2 | SYSTOLIC BLOOD PRESSURE: 114 MMHG | TEMPERATURE: 97.3 F | WEIGHT: 219 LBS

## 2024-01-18 DIAGNOSIS — I10 ESSENTIAL HYPERTENSION: Primary | ICD-10-CM

## 2024-01-18 DIAGNOSIS — E66.09 CLASS 2 OBESITY DUE TO EXCESS CALORIES WITHOUT SERIOUS COMORBIDITY WITH BODY MASS INDEX (BMI) OF 35.0 TO 35.9 IN ADULT: ICD-10-CM

## 2024-01-18 PROCEDURE — 3078F DIAST BP <80 MM HG: CPT | Performed by: INTERNAL MEDICINE

## 2024-01-18 PROCEDURE — 3074F SYST BP LT 130 MM HG: CPT | Performed by: INTERNAL MEDICINE

## 2024-01-18 PROCEDURE — 99211 OFF/OP EST MAY X REQ PHY/QHP: CPT

## 2024-01-18 PROCEDURE — 99214 OFFICE O/P EST MOD 30 MIN: CPT | Performed by: INTERNAL MEDICINE

## 2024-01-18 RX ORDER — PHENTERMINE HYDROCHLORIDE 37.5 MG/1
37.5 TABLET ORAL DAILY
Qty: 30 TABLET | Refills: 0 | Status: SHIPPED | OUTPATIENT
Start: 2024-01-18 | End: 2024-02-17

## 2024-01-18 NOTE — PATIENT INSTRUCTIONS
Rules:  Count every calorie every day  Limit sweets to one day per month  Limit chips/crackers/pretzels/nuts/popcorn to 150 adela/day  Eliminate all sugar sweetened beverages (including fruit juice)  Limit restaurants (including fast food and food from a convenience store) to one time every two weeks while in town    Requirements:  Make sure protein intake is at least 70 grams per day  Make sure that fiber intake is at least 25 grams per day  Take one multivitamin every day    Targets:  Limit calorie intake to 1400 calories/day  Limit net carbohydrate intake to 100 grams/day  Walk 30 minutes daily  Avoid eating 2 hours within bedtime.    Phentermine:  Take phentermine 37.5 mg, one-half to one tablet daily as needed for appetite suppression. Take each dose 30-90 min before effect will be needed.    While taking phentermine, check the Blood Pressure every morning and every evening.     If the systolic BP is >155 mmHg, the diastolic BP is >90 mm/Hg or the heart rate is > 100 beats per minute, do not take phentermine that day.    If the systolic BP is consistently >155 mmHg, the diastolic BP is consistently above 90 mm/Hg or the heart rate is consistently > 100 beats per minute, then stop taking phentermine altogether.    If the systolic BP >170 mmHg or the diastolic BP is >100 mmHg (even if it is only once), then phentermine should be stopped altogether without proving that any of these are consistently elevated.    Follow up in 1 month.

## 2024-01-18 NOTE — PROGRESS NOTES
mood   Full affect  Neuro: Moves all ext well  ______________________    HISTORY & ASSESSMENT/PLAN -     Problem 1 - Hypertension   HPI   - BP is well controlled today, on Lisinopril-HCTZ, patient asymptomatic  Assessment  - Controlled  Plan   - Continue medication, monitor. Weight reduction can help de-escalate the dose of medication. Weight reduction per plan below    Problem 2  - Obesity   HPI   - See above Background for description    Weight  Date    235.0  10/28/22    226.4  12/9/22    227.2  1/31/23    227.0  4/20/23     Phentermine #1    222.6  5/18/23     Phentermine #2    219.4  6/22/23     Phentermine continued    217.8  7/25/23     No medication    222.8  12/7/23     No medication    219.0  1/18/24     Phentermine restarted     Total weight change to date: -16.0 lbs.  Average daily energy variance:  10/28/2022 - 12/9/2022: -7.0 lbs (46453 Leonardo)/41 d = -598 Leonardo/d deficit.  12/9/2022 - 1/31/2023: +0.8 lbs (2800 Leonardo)/53 d = +53 Leonardo/d excess.  1/31/2023 - 4/20/2023: -0.2 lbs (700 Leonardo)/79 d = -9 Leonardo/d deficit.  4/20/2023 - 5/18/2023: -4.4 lbs (82622 Leonardo)/28 d = -550 Leonardo/d deficit.  5/18/2023 - 6/22/2023: -3.2 lbs (01286 Leonardo)/35 d = -320 Leonardo/d deficit.  6/22/2023 - 7/25/2023: -1.6 lbs (5600 Leonardo)/33 d = -170 Leonardo/d deficit.  (4.1% bw loss in 3m)  7/25/2023 - 12/7/2023: +5.0 lbs (06038 Leonardo)/135 d = +130 Leonardo/d excess.   12/7/2023 - 1/18/2024: -3.8 lbs (48038 Leonardo)/42 d = -317 Leonardo/d deficit.    Patient's estimated daily energy need (DEN) = 2068 Leonardo/d = 72455 Leonardo/wk    Update:  Calorie monitoring: started 2 weeks ago - targeting 5d/wk, 1698-0347 Leonardo/day  Sweets: has not craved any sweets  SSB: none, always water  Restaurant food: 1-2x/wk (pizza once a week - cutting portion)  Appetite suppressant: was having 1/2 phentermine when available and 8-9/10; s/e - denies  Home BP monitoring: has been wnl  Protein: salmon, shrimp, chicken, occ protein shakes  Fiber: psyllium husks / flavored metamucil bid, high fiber

## 2024-01-30 ENCOUNTER — OFFICE VISIT (OUTPATIENT)
Dept: FAMILY MEDICINE CLINIC | Age: 54
End: 2024-01-30
Payer: COMMERCIAL

## 2024-01-30 VITALS
TEMPERATURE: 97.4 F | HEART RATE: 92 BPM | DIASTOLIC BLOOD PRESSURE: 68 MMHG | HEIGHT: 66 IN | OXYGEN SATURATION: 98 % | WEIGHT: 219 LBS | BODY MASS INDEX: 35.2 KG/M2 | SYSTOLIC BLOOD PRESSURE: 112 MMHG

## 2024-01-30 DIAGNOSIS — R73.03 PRE-DIABETES: ICD-10-CM

## 2024-01-30 DIAGNOSIS — N93.9 VAGINAL BLEEDING: ICD-10-CM

## 2024-01-30 DIAGNOSIS — G47.33 OSA (OBSTRUCTIVE SLEEP APNEA): ICD-10-CM

## 2024-01-30 DIAGNOSIS — I10 ESSENTIAL HYPERTENSION: ICD-10-CM

## 2024-01-30 DIAGNOSIS — F41.9 ANXIETY: ICD-10-CM

## 2024-01-30 DIAGNOSIS — Z12.11 COLON CANCER SCREENING: Primary | ICD-10-CM

## 2024-01-30 LAB
ABSOLUTE IMMATURE GRANULOCYTE: <0.03 K/UL (ref 0–0.58)
ALBUMIN SERPL-MCNC: 4.5 G/DL (ref 3.5–5.2)
ALP BLD-CCNC: 52 U/L (ref 35–104)
ALT SERPL-CCNC: 37 U/L (ref 0–32)
ANION GAP SERPL CALCULATED.3IONS-SCNC: 14 MMOL/L (ref 7–16)
AST SERPL-CCNC: 34 U/L (ref 0–31)
BASOPHILS ABSOLUTE: 0.07 K/UL (ref 0–0.2)
BASOPHILS RELATIVE PERCENT: 1 % (ref 0–2)
BILIRUB SERPL-MCNC: 0.3 MG/DL (ref 0–1.2)
BUN BLDV-MCNC: 15 MG/DL (ref 6–20)
CALCIUM SERPL-MCNC: 9.6 MG/DL (ref 8.6–10.2)
CHLORIDE BLD-SCNC: 99 MMOL/L (ref 98–107)
CO2: 24 MMOL/L (ref 22–29)
CREAT SERPL-MCNC: 0.9 MG/DL (ref 0.5–1)
EOSINOPHILS ABSOLUTE: 0.09 K/UL (ref 0.05–0.5)
EOSINOPHILS RELATIVE PERCENT: 1 % (ref 0–6)
GFR SERPL CREATININE-BSD FRML MDRD: >60 ML/MIN/1.73M2
GLUCOSE BLD-MCNC: 99 MG/DL (ref 74–99)
HBA1C MFR BLD: 5.9 % (ref 4–5.6)
HCT VFR BLD CALC: 42.4 % (ref 34–48)
HEMOGLOBIN: 13.9 G/DL (ref 11.5–15.5)
IMMATURE GRANULOCYTES: 0 % (ref 0–5)
LYMPHOCYTES ABSOLUTE: 2.02 K/UL (ref 1.5–4)
LYMPHOCYTES RELATIVE PERCENT: 26 % (ref 20–42)
MCH RBC QN AUTO: 30.2 PG (ref 26–35)
MCHC RBC AUTO-ENTMCNC: 32.8 G/DL (ref 32–34.5)
MCV RBC AUTO: 92 FL (ref 80–99.9)
MONOCYTES ABSOLUTE: 0.45 K/UL (ref 0.1–0.95)
MONOCYTES RELATIVE PERCENT: 6 % (ref 2–12)
NEUTROPHILS ABSOLUTE: 5.1 K/UL (ref 1.8–7.3)
NEUTROPHILS RELATIVE PERCENT: 66 % (ref 43–80)
PDW BLD-RTO: 12.5 % (ref 11.5–15)
PLATELET # BLD: 266 K/UL (ref 130–450)
PMV BLD AUTO: 12.4 FL (ref 7–12)
POTASSIUM SERPL-SCNC: 4.2 MMOL/L (ref 3.5–5)
RBC # BLD: 4.61 M/UL (ref 3.5–5.5)
SODIUM BLD-SCNC: 137 MMOL/L (ref 132–146)
TOTAL PROTEIN: 8.3 G/DL (ref 6.4–8.3)
TSH SERPL DL<=0.05 MIU/L-ACNC: 2.17 UIU/ML (ref 0.27–4.2)
WBC # BLD: 7.7 K/UL (ref 4.5–11.5)

## 2024-01-30 PROCEDURE — 3078F DIAST BP <80 MM HG: CPT | Performed by: STUDENT IN AN ORGANIZED HEALTH CARE EDUCATION/TRAINING PROGRAM

## 2024-01-30 PROCEDURE — 99214 OFFICE O/P EST MOD 30 MIN: CPT | Performed by: STUDENT IN AN ORGANIZED HEALTH CARE EDUCATION/TRAINING PROGRAM

## 2024-01-30 PROCEDURE — 3074F SYST BP LT 130 MM HG: CPT | Performed by: STUDENT IN AN ORGANIZED HEALTH CARE EDUCATION/TRAINING PROGRAM

## 2024-01-30 RX ORDER — LISINOPRIL AND HYDROCHLOROTHIAZIDE 25; 20 MG/1; MG/1
1 TABLET ORAL DAILY
Qty: 90 TABLET | Refills: 1 | Status: SHIPPED | OUTPATIENT
Start: 2024-01-30

## 2024-01-30 ASSESSMENT — ENCOUNTER SYMPTOMS
ABDOMINAL PAIN: 0
COUGH: 0
RHINORRHEA: 0
VOMITING: 0
SHORTNESS OF BREATH: 0
NAUSEA: 0

## 2024-01-30 ASSESSMENT — PATIENT HEALTH QUESTIONNAIRE - PHQ9
1. LITTLE INTEREST OR PLEASURE IN DOING THINGS: 0
SUM OF ALL RESPONSES TO PHQ9 QUESTIONS 1 & 2: 0
SUM OF ALL RESPONSES TO PHQ QUESTIONS 1-9: 0
SUM OF ALL RESPONSES TO PHQ QUESTIONS 1-9: 0
2. FEELING DOWN, DEPRESSED OR HOPELESS: 0
SUM OF ALL RESPONSES TO PHQ QUESTIONS 1-9: 0
SUM OF ALL RESPONSES TO PHQ QUESTIONS 1-9: 0

## 2024-01-30 NOTE — PROGRESS NOTES
[facility]  Office Progress Note  Dr. Boogie López      Patient:  Susu Mcmullen 53 y.o. female     Date of Service: 1/30/24      Chief complaint:   Chief Complaint   Patient presents with    Hypertension         History of Present Illness   The patient is a 53 y.o. female  here to follow up of their HTN      Htn and hld + Pre DM  The 10-year ASCVD risk score (Javan DURAN, et al., 2019) is: 2.5%    Values used to calculate the score:      Age: 53 years      Sex: Female      Is Non- : No      Diabetic: No      Tobacco smoker: No      Systolic Blood Pressure: 112 mmHg      Is BP treated: Yes      HDL Cholesterol: 43 mg/dL      Total Cholesterol: 192 mg/dL    BP Readings from Last 3 Encounters:   01/30/24 112/68   01/18/24 (!) 114/57   12/07/23 (!) 153/66   Denies chest pain, shortness of breath, leg swelling, headache, vision changes and orthopnea  On losartan hctz    Vertigo has resolved     She has pre DM, not on any medications. Has been intentionally losing weight  Wt Readings from Last 3 Encounters:   01/30/24 99.3 kg (219 lb)   01/18/24 99.3 kg (219 lb)   12/07/23 101.1 kg (222 lb 12.8 oz)     Anxiety-well controlled with zoloft. No side effects. Would like to continue     KATARZYNA/CPAP-has been getting a warning that life of motor is going. She is compliant with her mask and uses it nightly. When the power went out int he past she had significantly worse sleep without it    Following with gyn for post menopausal symptoms, had vaginal bleeding when stopping hormonal therapy. Gyn asking for TSH to be added on to labs.      Past Medical History:      Diagnosis Date    Class 2 obesity due to excess calories without serious comorbidity with body mass index (BMI) of 37.0 to 37.9 in adult 5/31/2019    Elevated LFTs     pos. Hep B atb- has had vaccine    Essential hypertension 5/31/2019    Fatty liver 05/2014    ultrasound    Hemangioma of lip     uppper    Hypertension     Liver

## 2024-01-31 ENCOUNTER — PATIENT MESSAGE (OUTPATIENT)
Dept: FAMILY MEDICINE CLINIC | Age: 54
End: 2024-01-31

## 2024-02-22 ENCOUNTER — OFFICE VISIT (OUTPATIENT)
Dept: BARIATRICS/WEIGHT MGMT | Age: 54
End: 2024-02-22
Payer: COMMERCIAL

## 2024-02-22 VITALS
SYSTOLIC BLOOD PRESSURE: 115 MMHG | TEMPERATURE: 98.2 F | BODY MASS INDEX: 35 KG/M2 | DIASTOLIC BLOOD PRESSURE: 58 MMHG | WEIGHT: 217.8 LBS | HEIGHT: 66 IN | HEART RATE: 81 BPM

## 2024-02-22 DIAGNOSIS — E66.09 CLASS 1 OBESITY DUE TO EXCESS CALORIES WITHOUT SERIOUS COMORBIDITY WITH BODY MASS INDEX (BMI) OF 34.0 TO 34.9 IN ADULT: ICD-10-CM

## 2024-02-22 DIAGNOSIS — I10 ESSENTIAL HYPERTENSION: Primary | ICD-10-CM

## 2024-02-22 PROCEDURE — 99211 OFF/OP EST MAY X REQ PHY/QHP: CPT

## 2024-02-22 PROCEDURE — 99214 OFFICE O/P EST MOD 30 MIN: CPT | Performed by: INTERNAL MEDICINE

## 2024-02-22 PROCEDURE — 3078F DIAST BP <80 MM HG: CPT | Performed by: INTERNAL MEDICINE

## 2024-02-22 PROCEDURE — 3074F SYST BP LT 130 MM HG: CPT | Performed by: INTERNAL MEDICINE

## 2024-02-22 RX ORDER — PHENTERMINE HYDROCHLORIDE 37.5 MG/1
37.5 TABLET ORAL DAILY
Qty: 30 TABLET | Refills: 0 | Status: SHIPPED | OUTPATIENT
Start: 2024-02-22 | End: 2024-03-23

## 2024-02-22 NOTE — PROGRESS NOTES
CC -   Follow up of: Referred for:  newly dxed DM2, Obesity. HTN/KATARZYNA.    BACKGROUND -   Last visit: 1/18/24  First visit: 10/28/22    Obesity (all weight in lbs)  Began  shobha in the last 10 yrs - (+20 lbs in the last 10 yrs)  Initial Ht 66.25, Wt 235.0,  BMI 37.64  HS Grad wt 140-145   Lowest   wt 140   Highest  wt 235  Pattern of wt gain: gradual  Wt change past yr: about the same (228-235)  Most wt lost: 30 lbs ([facility] 15 yrs ago) now 3-6 lbs  Other diets attempted: difficulty trying to do on her own - watching diet and exercising    Desire to lose weight: 8-10/10    Initial Diet:    Number of meals per day - 2-3 (s-b)    Number of snacks per day - 1    Meal volume - 9\" plate,  usually seconds    Fast food/convenience store - 0-1x/week    Restaurants (not fast food) - 2x/week   Sweets - 0-1d/week   Chips - 2-3d/week   Crackers/pretzels - 3d/week   Nuts - 0d/week   Peanut Butter - 0d/week   Popcorn - 0-1d/week (in place of chips smt)   Dried fruit - 0d/week   Whole fruit - 3d/week   Breakfast cereal - 0d/week   Granola/Protein/Energy bar - 0d/week   Sugar sweetened beverages - no pop/soda, no fruit juice (0 calorie flavored water or juice with psyllium husk), coffee 1 large mug with unsweetened vanilla almond milk, no tea, no energy drink   Protein - No supplements   Fiber - Psyllium husk bid 1 tbsp x 2 = 9g/d    Wt effect of HR foods = 1050 Leonardo/wk = 150 Leonardo/d= 7% DEN = 16 lb/year.     Initial Exercise:    Gym membership - no    Walking - 2-4 x/wk - 35-40 min.    Running - no    Resistance - no    Aerobic class - no. 20-30 min of HIIT when not able to walk.    ______________________    [facility] -  Past Medical History:   Diagnosis Date    Class 2 obesity due to excess calories without serious comorbidity with body mass index (BMI) of 37.0 to 37.9 in adult 5/31/2019    Elevated LFTs     pos. Hep B atb- has had vaccine    Essential hypertension 5/31/2019    Fatty liver 05/2014    ultrasound    Hemangioma of lip     uppper

## 2024-03-21 ENCOUNTER — OFFICE VISIT (OUTPATIENT)
Dept: BARIATRICS/WEIGHT MGMT | Age: 54
End: 2024-03-21
Payer: COMMERCIAL

## 2024-03-21 VITALS
BODY MASS INDEX: 34.46 KG/M2 | SYSTOLIC BLOOD PRESSURE: 121 MMHG | HEIGHT: 66 IN | WEIGHT: 214.4 LBS | TEMPERATURE: 97.1 F | HEART RATE: 82 BPM | DIASTOLIC BLOOD PRESSURE: 65 MMHG

## 2024-03-21 DIAGNOSIS — I10 ESSENTIAL HYPERTENSION: Primary | ICD-10-CM

## 2024-03-21 DIAGNOSIS — E66.09 CLASS 1 OBESITY DUE TO EXCESS CALORIES WITHOUT SERIOUS COMORBIDITY WITH BODY MASS INDEX (BMI) OF 34.0 TO 34.9 IN ADULT: ICD-10-CM

## 2024-03-21 PROCEDURE — 3074F SYST BP LT 130 MM HG: CPT | Performed by: INTERNAL MEDICINE

## 2024-03-21 PROCEDURE — 99211 OFF/OP EST MAY X REQ PHY/QHP: CPT | Performed by: INTERNAL MEDICINE

## 2024-03-21 PROCEDURE — 3078F DIAST BP <80 MM HG: CPT | Performed by: INTERNAL MEDICINE

## 2024-03-21 PROCEDURE — 99214 OFFICE O/P EST MOD 30 MIN: CPT | Performed by: INTERNAL MEDICINE

## 2024-03-21 NOTE — PATIENT INSTRUCTIONS
Rules:  Count every calorie every day  Limit sweets to one day per month  Limit chips/crackers/pretzels/nuts/popcorn to 150 adela/day  Eliminate all sugar sweetened beverages (including fruit juice)  Limit restaurants (including fast food and food from a convenience store) to one time every two weeks while in town     Requirements:  Make sure protein intake is at least 70 grams per day  Make sure that fiber intake is at least 25 grams per day  Take one multivitamin every day     Targets:  Limit calorie intake to 1400 calories/day  Limit net carbohydrate intake to 100 grams/day  Walk 30 minutes daily  Avoid eating 2 hours within bedtime.     Phentermine:  Take phentermine 37.5 mg, one-half to one tablet daily as needed for appetite suppression. Take each dose 30-90 min before effect will be needed.     While taking phentermine, check the Blood Pressure every morning and every evening.      If the systolic BP is >155 mmHg, the diastolic BP is >90 mm/Hg or the heart rate is > 100 beats per minute, do not take phentermine that day.     If the systolic BP is consistently >155 mmHg, the diastolic BP is consistently above 90 mm/Hg or the heart rate is consistently > 100 beats per minute, then stop taking phentermine altogether.     If the systolic BP >170 mmHg or the diastolic BP is >100 mmHg (even if it is only once), then phentermine should be stopped altogether without proving that any of these are consistently elevated.    Weight must be below 208 lbs by next appt in order to cont Phentermine     Follow up the week of April 12th

## 2024-03-21 NOTE — PROGRESS NOTES
(36.2 °C) (Temporal)   Ht 1.683 m (5' 6.25\")   Wt 97.3 kg (214 lb 6.4 oz)   BMI 34.34 kg/m²    WN, WD, NAD  Lung:   Nml resp effort, CTA B/L  Heart:  RRR w/ 2/6 sys murmur, no LE pitting edema b/l, no carotid bruits  Psych: Normal mood              Full affect  Neuro: Moves all ext well  ______________________     HISTORY & ASSESSMENT/PLAN -      Problem 1        - Hypertension   HPI                  - BP today 121/65     Regimen: Lisinopril-HCTZ 20/25 daily     BP 1x/wk 130's/70's     Hypotension: has had some light-headedness with standing, but not sure if she is having a recurrence of vertigo (bp's have been normal)  Assessment    - Controlled  Plan                 - Continue medication, monitor. Weight reduction can help de-escalate the dose of medication. Weight reduction per plan below     Problem 2        - Obesity   HPI                  - See above Background for description                          Weight             Date                          235.0               10/28/22                          226.4               12/9/22                          227.2               1/31/23                          227.0               4/20/23     Phentermine #1                          222.6               5/18/23     Phentermine #2                          219.4               6/22/23     Phentermine continued                          217.8               7/25/23     No medication                          222.8               12/7/23     No medication                          219.0               1/18/24     Phentermine #1                          217.8               2/22/24     Phentermine #2    214.4    3/21/24      Phentermine     Total weight change to date: -17.2 lbs.  Average daily energy variance:  10/28/2022 - 12/9/2022: -7.0 lbs (34670 Leonardo)/41 d = -598 Leonardo/d deficit.  12/9/2022 - 1/31/2023: +0.8 lbs (2800 Leonardo)/53 d = +53 Leonardo/d excess.  1/31/2023 - 4/20/2023: -0.2 lbs (700 Leonardo)/79 d = -9 Leonardo/d

## 2024-03-22 LAB — NONINV COLON CA DNA+OCC BLD SCRN STL QL: NEGATIVE

## 2024-03-27 ENCOUNTER — PATIENT MESSAGE (OUTPATIENT)
Dept: FAMILY MEDICINE CLINIC | Age: 54
End: 2024-03-27

## 2024-03-27 DIAGNOSIS — H81.10 BENIGN PAROXYSMAL POSITIONAL VERTIGO, UNSPECIFIED LATERALITY: ICD-10-CM

## 2024-03-27 RX ORDER — IPRATROPIUM BROMIDE 21 UG/1
2 SPRAY, METERED NASAL EVERY 12 HOURS
Qty: 30 ML | Refills: 2 | Status: SHIPPED | OUTPATIENT
Start: 2024-03-27

## 2024-03-27 NOTE — TELEPHONE ENCOUNTER
From: Susu Mcmullen  To: Dr. Boogie López  Sent: 3/27/2024 7:22 AM EDT  Subject: Refill     Good morning, I recently started with seasonal allergies and the vertigo came back with the increased fluid production. I started taking the nasal spray Ipratropium Bromide and was hoping to get a refill on it. It is helping with the vertigo. Thank you.   Susu

## 2024-04-16 ENCOUNTER — OFFICE VISIT (OUTPATIENT)
Dept: BARIATRICS/WEIGHT MGMT | Age: 54
End: 2024-04-16
Payer: COMMERCIAL

## 2024-04-16 VITALS
WEIGHT: 212 LBS | SYSTOLIC BLOOD PRESSURE: 121 MMHG | TEMPERATURE: 97.8 F | DIASTOLIC BLOOD PRESSURE: 67 MMHG | BODY MASS INDEX: 34.07 KG/M2 | HEART RATE: 88 BPM | HEIGHT: 66 IN

## 2024-04-16 DIAGNOSIS — E66.09 CLASS 1 OBESITY DUE TO EXCESS CALORIES WITHOUT SERIOUS COMORBIDITY WITH BODY MASS INDEX (BMI) OF 33.0 TO 33.9 IN ADULT: ICD-10-CM

## 2024-04-16 DIAGNOSIS — I10 ESSENTIAL HYPERTENSION: Primary | ICD-10-CM

## 2024-04-16 PROCEDURE — 99211 OFF/OP EST MAY X REQ PHY/QHP: CPT

## 2024-04-16 PROCEDURE — 99213 OFFICE O/P EST LOW 20 MIN: CPT | Performed by: INTERNAL MEDICINE

## 2024-04-16 PROCEDURE — 3078F DIAST BP <80 MM HG: CPT | Performed by: INTERNAL MEDICINE

## 2024-04-16 PROCEDURE — 3074F SYST BP LT 130 MM HG: CPT | Performed by: INTERNAL MEDICINE

## 2024-04-16 NOTE — PROGRESS NOTES
CC -   HTN, KATARZYNA     BACKGROUND -   Last visit: 03/21/24  First visit: 10/28/22     Obesity (all weight in lbs)  Began  shobha in the last 10 yrs - (+20 lbs in the last 10 yrs)  Initial Ht 66.25, Wt 235.0,  BMI 37.64  HS Grad wt 140-145   Lowest   wt 140   Highest  wt 235  Pattern of wt gain: gradual  Wt change past yr: about the same (228-235)  Most wt lost: 30 lbs (WW 15 yrs ago) now 3-6 lbs  Other diets attempted: difficulty trying to do on her own - watching diet and exercising     Desire to lose weight: 8-10/10     Initial Diet:            Number of meals per day       - 2-3 (s-b)            Number of snacks per day      - 1            Meal volume                           - 9\" plate,  usually seconds            Fast food/convenience store  - 0-1x/week            Restaurants (not fast food)     - 2x/week           Sweets                                    - 0-1d/week           Chips                                      - 2-3d/week           Crackers/pretzels                    - 3d/week           Nuts                                        - 0d/week           Peanut Butter                          - 0d/week           Popcorn                                  - 0-1d/week (in place of chips smt)           Dried fruit                                - 0d/week           Whole fruit                              - 3d/week           Breakfast cereal                      - 0d/week           Granola/Protein/Energy bar    - 0d/week           Sugar sweetened beverages - no pop/soda, no fruit juice (0 calorie flavored water or juice with psyllium husk), coffee 1 large mug with unsweetened vanilla almond milk, no tea, no energy drink           Protein                                    - No supplements           Fiber                                       - Psyllium husk bid 1 tbsp x 2 = 9g/d     Wt effect of HR foods = 1050 Leonardo/wk = 150 Leonardo/d= 7% DEN = 16 lb/year.          Initial Exercise:            Gym membership    - no

## 2024-04-16 NOTE — PATIENT INSTRUCTIONS
Rules:  Count every calorie every day  Limit sweets to one day per month  Limit chips/crackers/pretzels/nuts/popcorn to 150 adela/day  Eliminate all sugar sweetened beverages (including fruit juice)  Limit restaurants (including fast food and food from a convenience store) to one time every two weeks while in town     Requirements:  Make sure protein intake is at least 70 grams per day  Make sure that fiber intake is at least 25 grams per day  Take one multivitamin every day     Targets:  Limit calorie intake to 1400 calories/day  Limit net carbohydrate intake to 100 grams/day  Walk 30 minutes daily  Avoid eating 2 hours within bedtime.    Please come for a weight recheck within the next 12 days.

## 2024-05-21 ENCOUNTER — OFFICE VISIT (OUTPATIENT)
Dept: BARIATRICS/WEIGHT MGMT | Age: 54
End: 2024-05-21
Payer: COMMERCIAL

## 2024-05-21 VITALS
HEART RATE: 81 BPM | BODY MASS INDEX: 33.97 KG/M2 | SYSTOLIC BLOOD PRESSURE: 108 MMHG | WEIGHT: 211.4 LBS | HEIGHT: 66 IN | DIASTOLIC BLOOD PRESSURE: 54 MMHG | TEMPERATURE: 98.2 F

## 2024-05-21 DIAGNOSIS — E66.09 CLASS 1 OBESITY DUE TO EXCESS CALORIES WITHOUT SERIOUS COMORBIDITY WITH BODY MASS INDEX (BMI) OF 33.0 TO 33.9 IN ADULT: ICD-10-CM

## 2024-05-21 DIAGNOSIS — I10 ESSENTIAL HYPERTENSION: Primary | ICD-10-CM

## 2024-05-21 DIAGNOSIS — K76.0 FATTY LIVER DISEASE, NONALCOHOLIC: ICD-10-CM

## 2024-05-21 DIAGNOSIS — G47.33 OSA (OBSTRUCTIVE SLEEP APNEA): ICD-10-CM

## 2024-05-21 PROCEDURE — 99211 OFF/OP EST MAY X REQ PHY/QHP: CPT

## 2024-05-21 PROCEDURE — 99214 OFFICE O/P EST MOD 30 MIN: CPT | Performed by: INTERNAL MEDICINE

## 2024-05-21 PROCEDURE — 3078F DIAST BP <80 MM HG: CPT | Performed by: INTERNAL MEDICINE

## 2024-05-21 PROCEDURE — 3074F SYST BP LT 130 MM HG: CPT | Performed by: INTERNAL MEDICINE

## 2024-05-21 RX ORDER — TIRZEPATIDE 2.5 MG/.5ML
2.5 INJECTION, SOLUTION SUBCUTANEOUS WEEKLY
Qty: 2 ML | Refills: 2 | Status: SHIPPED
Start: 2024-05-21 | End: 2024-05-21

## 2024-05-21 RX ORDER — TIRZEPATIDE 2.5 MG/.5ML
2.5 INJECTION, SOLUTION SUBCUTANEOUS WEEKLY
Qty: 2 ML | Refills: 2 | Status: SHIPPED | OUTPATIENT
Start: 2024-05-21

## 2024-05-21 NOTE — PATIENT INSTRUCTIONS
Rules:  Count every calorie every day  Limit sweets to one day per month  Limit chips/crackers/pretzels/nuts/popcorn to 150 adela/day  Eliminate all sugar sweetened beverages (including fruit juice)  Limit restaurants (including fast food and food from a convenience store) to one time every two weeks while in town     Requirements:  Make sure protein intake is at least 70 grams per day  Make sure that fiber intake is at least 25 grams per day  Take one multivitamin every day     Targets:  Limit calorie intake to 1400 calories/day  Limit net carbohydrate intake to 100 grams/day  Walk 30 minutes daily  Avoid eating 2 hours within bedtime.    Tirzepatide (Zepbound):  Start with Tirzepatide 2.5 mg under the skin once a week. Look for more information on this medicine online at https://zepbound.SongAfter.com.    Follow up in about 8 weeks.

## 2024-07-30 ENCOUNTER — OFFICE VISIT (OUTPATIENT)
Dept: FAMILY MEDICINE CLINIC | Age: 54
End: 2024-07-30
Payer: COMMERCIAL

## 2024-07-30 VITALS
WEIGHT: 216 LBS | HEART RATE: 88 BPM | SYSTOLIC BLOOD PRESSURE: 120 MMHG | OXYGEN SATURATION: 99 % | TEMPERATURE: 97.7 F | BODY MASS INDEX: 34.72 KG/M2 | HEIGHT: 66 IN | DIASTOLIC BLOOD PRESSURE: 80 MMHG

## 2024-07-30 DIAGNOSIS — Z00.00 WELL ADULT EXAM: Primary | ICD-10-CM

## 2024-07-30 DIAGNOSIS — I10 ESSENTIAL HYPERTENSION: ICD-10-CM

## 2024-07-30 DIAGNOSIS — F41.9 ANXIETY: ICD-10-CM

## 2024-07-30 LAB
ANION GAP SERPL CALCULATED.3IONS-SCNC: 15 MMOL/L (ref 7–16)
BUN BLDV-MCNC: 17 MG/DL (ref 6–20)
CALCIUM SERPL-MCNC: 9.8 MG/DL (ref 8.6–10.2)
CHLORIDE BLD-SCNC: 98 MMOL/L (ref 98–107)
CO2: 22 MMOL/L (ref 22–29)
CREAT SERPL-MCNC: 0.9 MG/DL (ref 0.5–1)
GFR, ESTIMATED: 72 ML/MIN/1.73M2
GLUCOSE BLD-MCNC: 116 MG/DL (ref 74–99)
POTASSIUM SERPL-SCNC: 4.2 MMOL/L (ref 3.5–5)
SODIUM BLD-SCNC: 135 MMOL/L (ref 132–146)

## 2024-07-30 PROCEDURE — 99396 PREV VISIT EST AGE 40-64: CPT | Performed by: STUDENT IN AN ORGANIZED HEALTH CARE EDUCATION/TRAINING PROGRAM

## 2024-07-30 PROCEDURE — 3079F DIAST BP 80-89 MM HG: CPT | Performed by: STUDENT IN AN ORGANIZED HEALTH CARE EDUCATION/TRAINING PROGRAM

## 2024-07-30 PROCEDURE — 3074F SYST BP LT 130 MM HG: CPT | Performed by: STUDENT IN AN ORGANIZED HEALTH CARE EDUCATION/TRAINING PROGRAM

## 2024-07-30 RX ORDER — LISINOPRIL AND HYDROCHLOROTHIAZIDE 25; 20 MG/1; MG/1
1 TABLET ORAL DAILY
Qty: 90 TABLET | Refills: 1 | Status: SHIPPED | OUTPATIENT
Start: 2024-07-30

## 2024-07-30 SDOH — ECONOMIC STABILITY: FOOD INSECURITY: WITHIN THE PAST 12 MONTHS, YOU WORRIED THAT YOUR FOOD WOULD RUN OUT BEFORE YOU GOT MONEY TO BUY MORE.: NEVER TRUE

## 2024-07-30 SDOH — ECONOMIC STABILITY: INCOME INSECURITY: HOW HARD IS IT FOR YOU TO PAY FOR THE VERY BASICS LIKE FOOD, HOUSING, MEDICAL CARE, AND HEATING?: NOT HARD AT ALL

## 2024-07-30 SDOH — ECONOMIC STABILITY: FOOD INSECURITY: WITHIN THE PAST 12 MONTHS, THE FOOD YOU BOUGHT JUST DIDN'T LAST AND YOU DIDN'T HAVE MONEY TO GET MORE.: NEVER TRUE

## 2024-07-30 ASSESSMENT — ENCOUNTER SYMPTOMS
ABDOMINAL PAIN: 0
VOMITING: 0
NAUSEA: 0
RHINORRHEA: 0
SHORTNESS OF BREATH: 0
COUGH: 0

## 2024-07-30 NOTE — PROGRESS NOTES
including possible risks and complications,  especially if left uncontrolled. Counseled regarding the possible side effects, risks, benefits and alternatives to treatment;patient and/or guardian verbalizes understanding, agrees, feels comfortable with and wishes to proceed with above treatment plan.    Call or go to ED immediately if symptoms worsen or persist. Advised patient to call with any new medication issues, and, as applicable, read all Rx info from pharmacy to assure aware of all possible risks and side effects of medicationbefore taking.     Patient and/or guardian given opportunity to ask questions/raise concerns.  The patient verbalized comfort and understanding ofinstructions.      Return to Office: No follow-ups on file.    Medication List:    Current Outpatient Medications   Medication Sig Dispense Refill    lisinopril-hydroCHLOROthiazide (PRINZIDE;ZESTORETIC) 20-25 MG per tablet Take 1 tablet by mouth daily 90 tablet 1    sertraline (ZOLOFT) 50 MG tablet Take 1 tablet by mouth daily 90 tablet 1    ipratropium (ATROVENT) 0.03 % nasal spray 2 sprays by Each Nostril route in the morning and 2 sprays in the evening. 30 mL 2    Drospirenone 4 MG TABS Take by mouth      diphenhydrAMINE HCl (BENADRYL PO) Take 25 mg by mouth nightly 3 tablets every night      PSYLLIUM PO Take by mouth      ibuprofen (ADVIL;MOTRIN) 200 MG tablet Take 1 tablet by mouth every 6 hours as needed for Pain       No current facility-administered medications for this visit.        Boogie López MD         This document may have been prepared at least partially through the use of voice recognition software. Although effort is taken to assure the accuracy ofthis document, it is possible that grammatical, syntax, or spelling errors may occur.

## 2024-09-24 ENCOUNTER — OFFICE VISIT (OUTPATIENT)
Dept: BARIATRICS/WEIGHT MGMT | Age: 54
End: 2024-09-24
Payer: COMMERCIAL

## 2024-09-24 VITALS
TEMPERATURE: 97.1 F | WEIGHT: 219.8 LBS | HEART RATE: 79 BPM | HEIGHT: 66 IN | SYSTOLIC BLOOD PRESSURE: 143 MMHG | BODY MASS INDEX: 35.32 KG/M2 | DIASTOLIC BLOOD PRESSURE: 74 MMHG

## 2024-09-24 DIAGNOSIS — I10 ESSENTIAL HYPERTENSION: Primary | ICD-10-CM

## 2024-09-24 DIAGNOSIS — E66.01 CLASS 2 SEVERE OBESITY DUE TO EXCESS CALORIES WITH SERIOUS COMORBIDITY AND BODY MASS INDEX (BMI) OF 35.0 TO 35.9 IN ADULT: ICD-10-CM

## 2024-09-24 PROCEDURE — 3077F SYST BP >= 140 MM HG: CPT | Performed by: INTERNAL MEDICINE

## 2024-09-24 PROCEDURE — 3078F DIAST BP <80 MM HG: CPT | Performed by: INTERNAL MEDICINE

## 2024-09-24 PROCEDURE — 99214 OFFICE O/P EST MOD 30 MIN: CPT | Performed by: INTERNAL MEDICINE

## 2024-09-24 PROCEDURE — 99211 OFF/OP EST MAY X REQ PHY/QHP: CPT

## 2024-09-24 RX ORDER — PHENTERMINE HYDROCHLORIDE 37.5 MG/1
TABLET ORAL
Qty: 30 TABLET | Refills: 0 | Status: SHIPPED | OUTPATIENT
Start: 2024-09-24 | End: 2024-10-24

## 2024-10-03 ENCOUNTER — INITIAL CONSULT (OUTPATIENT)
Dept: BARIATRICS/WEIGHT MGMT | Age: 54
End: 2024-10-03
Payer: COMMERCIAL

## 2024-10-03 VITALS — BODY MASS INDEX: 35.52 KG/M2 | WEIGHT: 221 LBS | HEIGHT: 66 IN

## 2024-10-03 DIAGNOSIS — Z71.3 DIETARY COUNSELING: ICD-10-CM

## 2024-10-03 DIAGNOSIS — E66.09 CLASS 2 OBESITY DUE TO EXCESS CALORIES WITH BODY MASS INDEX (BMI) OF 35.0 TO 35.9 IN ADULT, UNSPECIFIED WHETHER SERIOUS COMORBIDITY PRESENT: Primary | ICD-10-CM

## 2024-10-03 DIAGNOSIS — E66.812 CLASS 2 OBESITY DUE TO EXCESS CALORIES WITH BODY MASS INDEX (BMI) OF 35.0 TO 35.9 IN ADULT, UNSPECIFIED WHETHER SERIOUS COMORBIDITY PRESENT: Primary | ICD-10-CM

## 2024-10-03 PROCEDURE — 97802 MEDICAL NUTRITION INDIV IN: CPT | Performed by: DIETITIAN, REGISTERED

## 2024-10-03 NOTE — PROGRESS NOTES
Medical Nutrition Therapy - Initial     Provided initial weight loss diet counseling to pt.  Pt weighed 221 lbs today, indicating a loss of 14 lbs since beginning weight loss plan (235 lbs on 10/28/22).  Plan is as follows, per Dr Jorgensen:    Rules:  Limit sweets to one day per month  Limit chips/crackers/pretzels/nuts/popcorn to 150 adela/day  Eliminate all sugar sweetened beverages (including fruit juice)  Limit restaurants (including fast food and food from a convenience store) to one time every two weeks while in town     Requirements:  Make sure protein intake is at least 85 grams per day  Make sure that fiber intake is at least 25 grams per day  Take one multivitamin every day     Targets:  Limit calorie intake to 1400 calories/day  Walk 30 minutes daily  Avoid eating 2 hours within bedtime.    Medication:          Take Phentermine per current orders      - Review of pt's current diet plan and level of compliance with plan:  Pt has been following with Dr Jorgensen for medical wt loss for nearly 2 years.  Weight is down, however, pt admitted to noncompliance in recent months.  Pt said she does her best with weight loss when counting and tracking her intake and she has not done so in recent months.  Pt now has a digital scale and plans to control portions better, due to portion control being a problem are for her.  Pt said she works from home and therefore does cook a lot and does not go out to restaurants often.  Pt is not compliant with her plan except for she is currently walking most days and walks anywhere from 2-4 miles.  She did mention that she does drink ETOH, however, when following her plan, she does count the calories in her daily total.  Encouraged pt to begin following her plan.  Congratulated her on her success so far.  Pt noted that she is a stress eater and that another issue for her is portion control.  Discussed proper portion

## 2024-10-03 NOTE — PATIENT INSTRUCTIONS
Tips/Suggestions:    Focus first and foremost on the following:  Count all calories - written records or an sophie (Intent) can work fine  Limit calories to 1400 or less    Also:  Increase protein to 85 grams and fiber to 25 grams per day  Continue walking or exercising 30 min or more per day  Recipes:  when looking up recipes, search on [employer] with words like \"healthy\", \"low fat\", \"lean\", or \"heart healthy\" and you will get good, lower calorie recipes.     Contact:  Mian Davis RD, [facility] [phone]

## 2024-10-24 ENCOUNTER — OFFICE VISIT (OUTPATIENT)
Dept: BARIATRICS/WEIGHT MGMT | Age: 54
End: 2024-10-24
Payer: COMMERCIAL

## 2024-10-24 VITALS
WEIGHT: 216.6 LBS | HEIGHT: 66 IN | DIASTOLIC BLOOD PRESSURE: 75 MMHG | BODY MASS INDEX: 34.81 KG/M2 | TEMPERATURE: 42.8 F | HEART RATE: 76 BPM | SYSTOLIC BLOOD PRESSURE: 132 MMHG

## 2024-10-24 DIAGNOSIS — I10 ESSENTIAL HYPERTENSION: Primary | ICD-10-CM

## 2024-10-24 DIAGNOSIS — E66.01 CLASS 2 SEVERE OBESITY DUE TO EXCESS CALORIES WITH SERIOUS COMORBIDITY AND BODY MASS INDEX (BMI) OF 35.0 TO 35.9 IN ADULT: ICD-10-CM

## 2024-10-24 DIAGNOSIS — E66.812 CLASS 2 SEVERE OBESITY DUE TO EXCESS CALORIES WITH SERIOUS COMORBIDITY AND BODY MASS INDEX (BMI) OF 35.0 TO 35.9 IN ADULT: ICD-10-CM

## 2024-10-24 PROCEDURE — 3078F DIAST BP <80 MM HG: CPT | Performed by: INTERNAL MEDICINE

## 2024-10-24 PROCEDURE — 99214 OFFICE O/P EST MOD 30 MIN: CPT | Performed by: INTERNAL MEDICINE

## 2024-10-24 PROCEDURE — 3075F SYST BP GE 130 - 139MM HG: CPT | Performed by: INTERNAL MEDICINE

## 2024-10-24 PROCEDURE — 99211 OFF/OP EST MAY X REQ PHY/QHP: CPT | Performed by: INTERNAL MEDICINE

## 2024-10-24 RX ORDER — PHENTERMINE HYDROCHLORIDE 37.5 MG/1
TABLET ORAL
Qty: 30 TABLET | Refills: 0 | Status: SHIPPED | OUTPATIENT
Start: 2024-10-24 | End: 2024-11-23

## 2024-10-24 RX ORDER — DROSPIRENONE 4 MG/1
TABLET, FILM COATED ORAL
COMMUNITY
Start: 2024-10-08

## 2024-10-24 NOTE — PATIENT INSTRUCTIONS
Rules:  Limit sweets to one day per month  Limit chips/crackers/pretzels/nuts/popcorn to 150 adela/day  Eliminate all sugar sweetened beverages (including fruit juice)  Limit restaurants (including fast food and food from a convenience store) to one time every two weeks while in town     Requirements:  Make sure protein intake is at least 85 grams per day  Make sure that fiber intake is at least 25 grams per day  Take one multivitamin every day     Targets:  Limit calorie intake to 1400 calories/day  Walk 30 minutes daily  Avoid eating 2 hours within bedtime.    Phentermine:  Take phentermine 37.5 mg, one-half to one tablet daily as needed for appetite suppression. Take each dose 30-90 min before effect will be needed.    While taking phentermine, check the Blood Pressure every morning and every evening.     If the systolic BP is >155 mmHg, the diastolic BP is >90 mm/Hg or the heart rate is > 100 beats per minute, do not take phentermine that day.    If the systolic BP is consistently >155 mmHg, the diastolic BP is consistently above 90 mm/Hg or the heart rate is consistently > 100 beats per minute, then stop taking phentermine altogether.    If the systolic BP >170 mmHg or the diastolic BP is >100 mmHg (even if it is only once), then phentermine should be stopped altogether without proving that any of these are consistently elevated.    Goal weight   For December '24: 208.8 lbs or less;   For January '25: 205.7 lbs or less;    Follow up in 1 month.

## 2024-10-24 NOTE — PROGRESS NOTES
(including fruit juice)  Limit restaurants (including fast food and food from a convenience store) to one time every two weeks while in town     Requirements:  Make sure protein intake is at least 85 grams per day  Make sure that fiber intake is at least 25 grams per day  Take one multivitamin every day     Targets:  Limit calorie intake to 1400 calories/day  Walk 30 minutes daily  Avoid eating 2 hours within bedtime.    Phentermine:  Take phentermine 37.5 mg, one-half to one tablet daily as needed for appetite suppression. Take each dose 30-90 min before effect will be needed.    While taking phentermine, check the Blood Pressure every morning and every evening.     If the systolic BP is >155 mmHg, the diastolic BP is >90 mm/Hg or the heart rate is > 100 beats per minute, do not take phentermine that day.    If the systolic BP is consistently >155 mmHg, the diastolic BP is consistently above 90 mm/Hg or the heart rate is consistently > 100 beats per minute, then stop taking phentermine altogether.    If the systolic BP >170 mmHg or the diastolic BP is >100 mmHg (even if it is only once), then phentermine should be stopped altogether without proving that any of these are consistently elevated.    Goal weight   For December '24: 208.8 lbs or less;   For January '25: 205.7 lbs or less;    Follow up in 1 month.    Medication management: Phentermine    Jael Jorgensen MD  3/21/24

## 2024-11-21 ENCOUNTER — OFFICE VISIT (OUTPATIENT)
Dept: BARIATRICS/WEIGHT MGMT | Age: 54
End: 2024-11-21
Payer: COMMERCIAL

## 2024-11-21 VITALS
WEIGHT: 216.2 LBS | TEMPERATURE: 97.1 F | BODY MASS INDEX: 34.75 KG/M2 | DIASTOLIC BLOOD PRESSURE: 74 MMHG | HEIGHT: 66 IN | SYSTOLIC BLOOD PRESSURE: 144 MMHG | HEART RATE: 85 BPM

## 2024-11-21 DIAGNOSIS — I10 ESSENTIAL HYPERTENSION: Primary | ICD-10-CM

## 2024-11-21 DIAGNOSIS — E66.09 CLASS 1 OBESITY DUE TO EXCESS CALORIES WITHOUT SERIOUS COMORBIDITY WITH BODY MASS INDEX (BMI) OF 34.0 TO 34.9 IN ADULT: ICD-10-CM

## 2024-11-21 DIAGNOSIS — E66.811 CLASS 1 OBESITY DUE TO EXCESS CALORIES WITHOUT SERIOUS COMORBIDITY WITH BODY MASS INDEX (BMI) OF 34.0 TO 34.9 IN ADULT: ICD-10-CM

## 2024-11-21 PROCEDURE — 3078F DIAST BP <80 MM HG: CPT | Performed by: INTERNAL MEDICINE

## 2024-11-21 PROCEDURE — 3077F SYST BP >= 140 MM HG: CPT | Performed by: INTERNAL MEDICINE

## 2024-11-21 PROCEDURE — 99211 OFF/OP EST MAY X REQ PHY/QHP: CPT | Performed by: INTERNAL MEDICINE

## 2024-11-21 PROCEDURE — 99214 OFFICE O/P EST MOD 30 MIN: CPT | Performed by: INTERNAL MEDICINE

## 2024-11-21 RX ORDER — TOPIRAMATE 25 MG/1
TABLET, FILM COATED ORAL
Qty: 180 TABLET | Refills: 1 | Status: SHIPPED | OUTPATIENT
Start: 2024-11-21

## 2024-11-21 RX ORDER — PHENTERMINE HYDROCHLORIDE 37.5 MG/1
TABLET ORAL
Qty: 30 TABLET | Refills: 0 | Status: SHIPPED | OUTPATIENT
Start: 2024-11-21 | End: 2024-12-21

## 2024-11-21 NOTE — PATIENT INSTRUCTIONS
Rules:  Limit sweets to one day per month  Limit chips/crackers/pretzels/nuts/popcorn to 150 adela/day  Eliminate all sugar sweetened beverages (including fruit juice)  Limit restaurants (including fast food and food from a convenience store) to one time every two weeks while in town     Requirements:  Make sure protein intake is at least 85 grams per day  Make sure that fiber intake is at least 25 grams per day  Take one multivitamin every day     Targets:  Limit calorie intake to 1400 calories/day  Limit net carbohydrate intake to 105 grams/day  Walk 30 minutes daily  Avoid eating 2 hours within bedtime.    Phentermine:  Take phentermine 37.5 mg, one-half to one tablet daily as needed for appetite suppression. Take each dose 30-90 min before effect will be needed.    While taking phentermine, check the Blood Pressure every morning and every evening.     If the systolic BP is >155 mmHg, the diastolic BP is >90 mm/Hg or the heart rate is > 100 beats per minute, do not take phentermine that day.    If the systolic BP is consistently >155 mmHg, the diastolic BP is consistently above 90 mm/Hg or the heart rate is consistently > 100 beats per minute, then stop taking phentermine altogether.    If the systolic BP >170 mmHg or the diastolic BP is >100 mmHg (even if it is only once), then phentermine should be stopped altogether without proving that any of these are consistently elevated.    Topiramate:  Start Topiramate 25 mg. Take one tablet daily in the evenings for one week. Then increase to two tablets daily in the evening.    Goal weight   For December '24: 208.8 lbs or less;   For January '25: 205.7 lbs or less;  For February '25: 205.3 lbs or less.    Follow up in 1 month.

## 2024-11-21 NOTE — PROGRESS NOTES
-598 Leonardo/d deficit.  12/9/2022 - 1/31/2023: +0.8 lbs (2800 Leonardo)/53 d = +53 Leonardo/d excess.  1/31/2023 - 4/20/2023: -0.2 lbs (700 Leonardo)/79 d = -9 Leonardo/d deficit.  4/20/2023 - 5/18/2023: -4.4 lbs (60257 Leonardo)/28 d = -550 Leonardo/d deficit.  5/18/2023 - 6/22/2023: -3.2 lbs (19427 Leonardo)/35 d = -320 Leonardo/d deficit.  6/22/2023 - 7/25/2023: -1.6 lbs (5600 Leonardo)/33 d = -170 Leonardo/d deficit.  (4.1% bw loss in 3m)  7/25/2023 - 12/7/2023: +5.0 lbs (88554 Leonardo)/135 d = +130 Leonardo/d excess.   12/7/2023 - 1/18/2024: -3.8 lbs (30021 Leonardo)/42 d = -317 Leonardo/d deficit.  1/18/2024 - 2/22/2024: -1.2 lbs (4200 Leonardo)/35 d = -120 Leonardo/d deficit.  2/22/2024 - 3/21/2024: - 3.4 lbs (50260 Leonardo)/28d = - 425 Leonardo/d deficit  3/21/2024 - 4/16/2024: -2.4 lbs (8400 Leonardo)/26 d = -323 Leonardo/d deficit.   4/16/2024 - 5/21/2024: -0.6 lbs (2100 Leonardo)/35 d = -60 Leonardo/d deficit.   5/21/2024 - 9/24/2024: +8.4 lbs (28138 Leonardo)/126 d = +233 Leonardo/d excess.   9/24/2024 - 10/24/2024: -3.2 lbs (08627 Leonardo)/30 d = -373 Leonardo/d deficit.   10/24/2024 - 11/21/2024: -0.4 lbs (1400 Leonardo)/28 d = -50 Leonardo/d deficit.     2/22/24 - 5/21/24 : 217.8 - 211.4 = -2.9% change in body wt in about 3 months.     Patient's estimated daily energy need (DEN) = 2068 Leonardo/d = 57239 Leonardo/wk     Update:  Calorie monitoring: 3-4d/wk 2791-7818 Leonardo/day  Sweets: none  SSB: None  Restaurant/Fast food: about 1x/wk of RF (except 1 weekend - was out of town - watches  Appetite suppressant: Phentermine 37.5 mg, still 1/2 tablet a day (whole one interfered with her sleep), appetite suppression ~5-6/10, s/e - some dry mouth and constipation  Home BP: has been wnl  Protein: meat - >85g/day.  Fiber:  still using psyllium husk in the evening 1 scoop (4.5 grams) bid. Increased non-starchy vegetables for dinner  Water: 80-90 oz/day  Exercise 2x/wk cardio, and 1-2d/wk of resistance    Assessment    - Uncontrolled -> gradual improvement; working on low calorie diet, seems to be doing well with targeted elimination. Planned to continue

## 2024-12-19 ENCOUNTER — OFFICE VISIT (OUTPATIENT)
Dept: BARIATRICS/WEIGHT MGMT | Age: 54
End: 2024-12-19
Payer: COMMERCIAL

## 2024-12-19 VITALS
BODY MASS INDEX: 34.81 KG/M2 | HEIGHT: 66 IN | SYSTOLIC BLOOD PRESSURE: 145 MMHG | DIASTOLIC BLOOD PRESSURE: 61 MMHG | TEMPERATURE: 97.8 F | WEIGHT: 216.6 LBS | HEART RATE: 79 BPM

## 2024-12-19 DIAGNOSIS — E66.811 CLASS 1 OBESITY DUE TO EXCESS CALORIES WITHOUT SERIOUS COMORBIDITY WITH BODY MASS INDEX (BMI) OF 34.0 TO 34.9 IN ADULT: ICD-10-CM

## 2024-12-19 DIAGNOSIS — I10 ESSENTIAL HYPERTENSION: Primary | ICD-10-CM

## 2024-12-19 DIAGNOSIS — E66.09 CLASS 1 OBESITY DUE TO EXCESS CALORIES WITHOUT SERIOUS COMORBIDITY WITH BODY MASS INDEX (BMI) OF 34.0 TO 34.9 IN ADULT: ICD-10-CM

## 2024-12-19 PROCEDURE — 99211 OFF/OP EST MAY X REQ PHY/QHP: CPT

## 2024-12-19 PROCEDURE — 3078F DIAST BP <80 MM HG: CPT | Performed by: INTERNAL MEDICINE

## 2024-12-19 PROCEDURE — 99213 OFFICE O/P EST LOW 20 MIN: CPT | Performed by: INTERNAL MEDICINE

## 2024-12-19 PROCEDURE — 3077F SYST BP >= 140 MM HG: CPT | Performed by: INTERNAL MEDICINE

## 2024-12-19 NOTE — PROGRESS NOTES
restarted    216.6  10/24/24 Phentermine #2    216.2  24 Phentermine #3    216.6  24     Total weight change to date: -18.8 lbs.  Average daily energy variance:  10/28/2022 - 2022: -7.0 lbs (48715 Leonardo)/41 d = -598 Leonardo/d deficit.  2022 - 2023: +0.8 lbs (2800 Leonardo)/53 d = +53 Leonardo/d excess.  2023 - 2023: -0.2 lbs (700 Leonardo)/79 d = -9 Leonardo/d deficit.  2023 - 2023: -4.4 lbs (84337 Leonardo)/28 d = -550 Leonardo/d deficit.  2023 - 2023: -3.2 lbs (60864 Leonardo)/35 d = -320 Leonardo/d deficit.  2023 - 2023: -1.6 lbs (5600 Leonardo)/33 d = -170 Leonardo/d deficit.  (4.1% bw loss in 3m)  2023 - 2023: +5.0 lbs (62072 Leonardo)/135 d = +130 Leonardo/d excess.   2023 - 2024: -3.8 lbs (79657 Leonardo)/42 d = -317 Leonardo/d deficit.  2024 - 2024: -1.2 lbs (4200 Leonardo)/35 d = -120 Leonardo/d deficit.  2024 - 3/21/2024: - 3.4 lbs (84475 Leonardo)/28d = - 425 Leonardo/d deficit  3/21/2024 - 2024: -2.4 lbs (8400 Leonardo)/26 d = -323 Leonardo/d deficit.   2024 - 2024: -0.6 lbs (2100 Leonardo)/35 d = -60 Leonardo/d deficit.   2024 - 2024: +8.4 lbs (14715 Leonardo)/126 d = +233 Leonardo/d excess.   2024 - 10/24/2024: -3.2 lbs (43099 Elonardo)/30 d = -373 Leonardo/d deficit.   10/24/2024 - 2024: -0.4 lbs (1400 Leonardo)/28 d = -50 Leonardo/d deficit.     24 - 24 : 217.8 - 211.4 = -2.9% change in body wt in about 3 months.     Patient's estimated daily energy need (DEN) = 2068 Leonardo/d = 31683 Leonardo/wk     Update:  Calorie monitorin-4d/wk 5483-5939 Leonardo/day when she is monitoring  Sweets: had around Thanksgiving  SSB: None  Restaurant/Fast food: about 1x/wk of RF (except 1 weekend - was out of town - watches  Appetite suppressant: Phentermine 37.5 mg, still 1/2 tablet a day (whole one interfered with her sleep) - did not take during holidays, plus Topiramate 50 mg in the evening, appetite suppression ~5-6/10, s/e - some dry mouth and constipation  Home BP: has been wnl  Protein: meat - >85g/day when

## 2024-12-19 NOTE — PATIENT INSTRUCTIONS
Rules:  Limit sweets to one day per month  Limit chips/crackers/pretzels/nuts/popcorn to 150 adela/day  Eliminate all sugar sweetened beverages (including fruit juice)  Limit restaurants (including fast food and food from a convenience store) to one time every two weeks while in town     Requirements:  Make sure protein intake is at least 85 grams per day  Make sure that fiber intake is at least 25 grams per day  Take one multivitamin every day     Targets:  Limit calorie intake to 1400 calories/day  Limit net carbohydrate intake to 105 grams/day  Walk 30 minutes daily  Avoid eating 2 hours within bedtime.    You can continue with remaining of Phentermine. Please watch your blood pressure and heart rate with it.    Topiramate:  Continue Topiramate 25 mg two tablets daily in the evening.    Follow up in 2-3 months.

## 2025-02-03 SDOH — ECONOMIC STABILITY: FOOD INSECURITY: WITHIN THE PAST 12 MONTHS, THE FOOD YOU BOUGHT JUST DIDN'T LAST AND YOU DIDN'T HAVE MONEY TO GET MORE.: NEVER TRUE

## 2025-02-03 SDOH — ECONOMIC STABILITY: INCOME INSECURITY: IN THE LAST 12 MONTHS, WAS THERE A TIME WHEN YOU WERE NOT ABLE TO PAY THE MORTGAGE OR RENT ON TIME?: NO

## 2025-02-03 SDOH — ECONOMIC STABILITY: FOOD INSECURITY: WITHIN THE PAST 12 MONTHS, YOU WORRIED THAT YOUR FOOD WOULD RUN OUT BEFORE YOU GOT MONEY TO BUY MORE.: NEVER TRUE

## 2025-02-03 SDOH — ECONOMIC STABILITY: TRANSPORTATION INSECURITY
IN THE PAST 12 MONTHS, HAS THE LACK OF TRANSPORTATION KEPT YOU FROM MEDICAL APPOINTMENTS OR FROM GETTING MEDICATIONS?: NO

## 2025-02-03 ASSESSMENT — PATIENT HEALTH QUESTIONNAIRE - PHQ9
1. LITTLE INTEREST OR PLEASURE IN DOING THINGS: NOT AT ALL
SUM OF ALL RESPONSES TO PHQ QUESTIONS 1-9: 0
SUM OF ALL RESPONSES TO PHQ9 QUESTIONS 1 & 2: 0
2. FEELING DOWN, DEPRESSED OR HOPELESS: NOT AT ALL
SUM OF ALL RESPONSES TO PHQ QUESTIONS 1-9: 0
SUM OF ALL RESPONSES TO PHQ QUESTIONS 1-9: 0
1. LITTLE INTEREST OR PLEASURE IN DOING THINGS: NOT AT ALL
SUM OF ALL RESPONSES TO PHQ9 QUESTIONS 1 & 2: 0
SUM OF ALL RESPONSES TO PHQ QUESTIONS 1-9: 0
2. FEELING DOWN, DEPRESSED OR HOPELESS: NOT AT ALL

## 2025-02-04 ENCOUNTER — OFFICE VISIT (OUTPATIENT)
Dept: FAMILY MEDICINE CLINIC | Age: 55
End: 2025-02-04
Payer: COMMERCIAL

## 2025-02-04 VITALS
TEMPERATURE: 97.6 F | OXYGEN SATURATION: 98 % | SYSTOLIC BLOOD PRESSURE: 138 MMHG | DIASTOLIC BLOOD PRESSURE: 78 MMHG | HEIGHT: 66 IN | BODY MASS INDEX: 35.68 KG/M2 | HEART RATE: 92 BPM | WEIGHT: 222 LBS

## 2025-02-04 DIAGNOSIS — I10 ESSENTIAL HYPERTENSION: ICD-10-CM

## 2025-02-04 DIAGNOSIS — F41.9 ANXIETY: ICD-10-CM

## 2025-02-04 DIAGNOSIS — E78.5 HYPERLIPIDEMIA, UNSPECIFIED HYPERLIPIDEMIA TYPE: Primary | ICD-10-CM

## 2025-02-04 DIAGNOSIS — E78.5 HYPERLIPIDEMIA, UNSPECIFIED HYPERLIPIDEMIA TYPE: ICD-10-CM

## 2025-02-04 DIAGNOSIS — R73.9 HYPERGLYCEMIA: ICD-10-CM

## 2025-02-04 LAB
ALBUMIN: 4.5 G/DL (ref 3.5–5.2)
ALP BLD-CCNC: 52 U/L (ref 35–104)
ALT SERPL-CCNC: 41 U/L (ref 0–32)
ANION GAP SERPL CALCULATED.3IONS-SCNC: 13 MMOL/L (ref 7–16)
AST SERPL-CCNC: 37 U/L (ref 0–31)
BASOPHILS ABSOLUTE: 0.09 K/UL (ref 0–0.2)
BASOPHILS RELATIVE PERCENT: 1 % (ref 0–2)
BILIRUB SERPL-MCNC: 0.5 MG/DL (ref 0–1.2)
BUN BLDV-MCNC: 18 MG/DL (ref 6–20)
CALCIUM SERPL-MCNC: 9.8 MG/DL (ref 8.6–10.2)
CHLORIDE BLD-SCNC: 104 MMOL/L (ref 98–107)
CHOLESTEROL, TOTAL: 193 MG/DL
CO2: 23 MMOL/L (ref 22–29)
CREAT SERPL-MCNC: 0.9 MG/DL (ref 0.5–1)
EOSINOPHILS ABSOLUTE: 0.28 K/UL (ref 0.05–0.5)
EOSINOPHILS RELATIVE PERCENT: 4 % (ref 0–6)
GFR, ESTIMATED: 75 ML/MIN/1.73M2
GLUCOSE BLD-MCNC: 107 MG/DL (ref 74–99)
HBA1C MFR BLD: 5.6 % (ref 4–5.6)
HCT VFR BLD CALC: 41.4 % (ref 34–48)
HDLC SERPL-MCNC: 40 MG/DL
HEMOGLOBIN: 13.7 G/DL (ref 11.5–15.5)
IMMATURE GRANULOCYTES %: 0 % (ref 0–5)
IMMATURE GRANULOCYTES ABSOLUTE: <0.03 K/UL (ref 0–0.58)
LDL CHOLESTEROL: 120 MG/DL
LYMPHOCYTES ABSOLUTE: 2.16 K/UL (ref 1.5–4)
LYMPHOCYTES RELATIVE PERCENT: 30 % (ref 20–42)
MCH RBC QN AUTO: 30.7 PG (ref 26–35)
MCHC RBC AUTO-ENTMCNC: 33.1 G/DL (ref 32–34.5)
MCV RBC AUTO: 92.8 FL (ref 80–99.9)
MONOCYTES ABSOLUTE: 0.41 K/UL (ref 0.1–0.95)
MONOCYTES RELATIVE PERCENT: 6 % (ref 2–12)
NEUTROPHILS ABSOLUTE: 4.25 K/UL (ref 1.8–7.3)
NEUTROPHILS RELATIVE PERCENT: 59 % (ref 43–80)
PDW BLD-RTO: 13 % (ref 11.5–15)
PLATELET # BLD: 253 K/UL (ref 130–450)
PMV BLD AUTO: 11.6 FL (ref 7–12)
POTASSIUM SERPL-SCNC: 4.4 MMOL/L (ref 3.5–5)
RBC # BLD: 4.46 M/UL (ref 3.5–5.5)
SODIUM BLD-SCNC: 140 MMOL/L (ref 132–146)
TOTAL PROTEIN: 8.4 G/DL (ref 6.4–8.3)
TRIGL SERPL-MCNC: 163 MG/DL
VLDLC SERPL CALC-MCNC: 33 MG/DL
WBC # BLD: 7.2 K/UL (ref 4.5–11.5)

## 2025-02-04 PROCEDURE — 3078F DIAST BP <80 MM HG: CPT | Performed by: STUDENT IN AN ORGANIZED HEALTH CARE EDUCATION/TRAINING PROGRAM

## 2025-02-04 PROCEDURE — 3075F SYST BP GE 130 - 139MM HG: CPT | Performed by: STUDENT IN AN ORGANIZED HEALTH CARE EDUCATION/TRAINING PROGRAM

## 2025-02-04 PROCEDURE — 99214 OFFICE O/P EST MOD 30 MIN: CPT | Performed by: STUDENT IN AN ORGANIZED HEALTH CARE EDUCATION/TRAINING PROGRAM

## 2025-02-04 RX ORDER — LISINOPRIL AND HYDROCHLOROTHIAZIDE 20; 25 MG/1; MG/1
1 TABLET ORAL DAILY
Qty: 90 TABLET | Refills: 1 | Status: SHIPPED | OUTPATIENT
Start: 2025-02-04

## 2025-02-04 ASSESSMENT — ENCOUNTER SYMPTOMS
RHINORRHEA: 0
VOMITING: 0
SHORTNESS OF BREATH: 0
ABDOMINAL PAIN: 0
NAUSEA: 0
COUGH: 0

## 2025-02-04 NOTE — PROGRESS NOTES
[facility]  Office Note  Dr. Boogie López      Patient:  Susu Mcmullen 54 y.o. female     Date of Service: 25      Chief complaint:   Chief Complaint   Patient presents with    Anxiety    Hypertension         History of Present Illness   The patient is a 54 y.o. female  presented to the clinic with complaints as above.    UTD On colonoscopy. Gets mammography with gynecology-due in September    HTN-Denies chest pain, shortness of breath, leg swelling, headache, vision changes and orthopnea  BP Readings from Last 3 Encounters:   25 138/78   24 (!) 145/61   24 (!) 144/74   On ace/thiazide combo      The 10-year ASCVD risk score (Javan DURAN, et al., 2019) is: 4.1%    Values used to calculate the score:      Age: 54 years      Sex: Female      Is Non- : No      Diabetic: No      Tobacco smoker: No      Systolic Blood Pressure: 138 mmHg      Is BP treated: Yes      HDL Cholesterol: 43 mg/dL      Total Cholesterol: 192 mg/dL    BP Readings from Last 3 Encounters:   25 138/78   24 (!) 145/61   24 (!) 144/74       Anxiety-well controlled with zoloft. No side effects. Would like to continue      Hx of pre DM. Currently not on any medication. Diet controlled  Hemoglobin A1C   Date Value Ref Range Status   2024 5.9 (H) 4.0 - 5.6 % Final         Past Medical History:      Diagnosis Date    Class 2 obesity due to excess calories without serious comorbidity with body mass index (BMI) of 37.0 to 37.9 in adult 2019    Elevated LFTs     pos. Hep B atb- has had vaccine    Essential hypertension 2019    Fatty liver 2014    ultrasound    Hemangioma of lip     uppper    Hypertension     Liver function test abnormality 2019    KATARZYNA (obstructive sleep apnea) 2019    Preeclampsia     Sleep apnea        PastSurgical History:        Procedure Laterality Date     SECTION  1996    CHOLECYSTECTOMY, LAPAROSCOPIC  1996

## 2025-04-21 DIAGNOSIS — H81.10 BENIGN PAROXYSMAL POSITIONAL VERTIGO, UNSPECIFIED LATERALITY: ICD-10-CM

## 2025-04-22 RX ORDER — IPRATROPIUM BROMIDE 21 UG/1
SPRAY, METERED NASAL
Qty: 30 ML | Refills: 2 | Status: SHIPPED | OUTPATIENT
Start: 2025-04-22

## 2025-04-22 NOTE — TELEPHONE ENCOUNTER
Last Appointment:  2/4/2025  Future Appointments   Date Time Provider Department Center   8/5/2025  9:00 AM DestineyebBoogie hinojosa MD [facility]     Rx pended

## 2025-08-17 DIAGNOSIS — F41.9 ANXIETY: ICD-10-CM

## 2025-09-02 ENCOUNTER — OFFICE VISIT (OUTPATIENT)
Dept: FAMILY MEDICINE CLINIC | Age: 55
End: 2025-09-02

## 2025-09-02 VITALS
OXYGEN SATURATION: 98 % | SYSTOLIC BLOOD PRESSURE: 124 MMHG | DIASTOLIC BLOOD PRESSURE: 68 MMHG | TEMPERATURE: 98.2 F | HEART RATE: 92 BPM | BODY MASS INDEX: 37.12 KG/M2 | WEIGHT: 230 LBS

## 2025-09-02 DIAGNOSIS — Z00.00 WELL ADULT EXAM: Primary | ICD-10-CM

## 2025-09-02 DIAGNOSIS — E66.812 CLASS 2 OBESITY DUE TO EXCESS CALORIES WITHOUT SERIOUS COMORBIDITY WITH BODY MASS INDEX (BMI) OF 37.0 TO 37.9 IN ADULT: ICD-10-CM

## 2025-09-02 DIAGNOSIS — I10 ESSENTIAL HYPERTENSION: ICD-10-CM

## 2025-09-02 DIAGNOSIS — E66.09 CLASS 2 OBESITY DUE TO EXCESS CALORIES WITHOUT SERIOUS COMORBIDITY WITH BODY MASS INDEX (BMI) OF 37.0 TO 37.9 IN ADULT: ICD-10-CM

## 2025-09-02 DIAGNOSIS — Z87.898 HISTORY OF PREDIABETES: ICD-10-CM

## 2025-09-02 PROCEDURE — 99396 PREV VISIT EST AGE 40-64: CPT | Performed by: STUDENT IN AN ORGANIZED HEALTH CARE EDUCATION/TRAINING PROGRAM

## 2025-09-02 PROCEDURE — 3078F DIAST BP <80 MM HG: CPT | Performed by: STUDENT IN AN ORGANIZED HEALTH CARE EDUCATION/TRAINING PROGRAM

## 2025-09-02 PROCEDURE — 3074F SYST BP LT 130 MM HG: CPT | Performed by: STUDENT IN AN ORGANIZED HEALTH CARE EDUCATION/TRAINING PROGRAM

## 2025-09-02 RX ORDER — LISINOPRIL AND HYDROCHLOROTHIAZIDE 20; 25 MG/1; MG/1
1 TABLET ORAL DAILY
Qty: 90 TABLET | Refills: 1 | Status: SHIPPED | OUTPATIENT
Start: 2025-09-02

## 2025-09-02 RX ORDER — PHENTERMINE HYDROCHLORIDE 37.5 MG/1
37.5 TABLET ORAL
Qty: 30 TABLET | Refills: 0 | Status: SHIPPED | OUTPATIENT
Start: 2025-09-02 | End: 2025-10-02

## 2025-09-02 ASSESSMENT — ENCOUNTER SYMPTOMS
ABDOMINAL PAIN: 0
RHINORRHEA: 0
SHORTNESS OF BREATH: 0
VOMITING: 0
COUGH: 0
NAUSEA: 0